# Patient Record
Sex: FEMALE | Race: WHITE | NOT HISPANIC OR LATINO
[De-identification: names, ages, dates, MRNs, and addresses within clinical notes are randomized per-mention and may not be internally consistent; named-entity substitution may affect disease eponyms.]

---

## 2018-07-25 ENCOUNTER — TRANSCRIBE ORDERS (OUTPATIENT)
Dept: SCHEDULING | Age: 28
End: 2018-07-25

## 2018-07-25 DIAGNOSIS — R93.2 ABNORMAL FINDINGS ON DIAGNOSTIC IMAGING OF LIVER AND BILIARY TRACT: Primary | ICD-10-CM

## 2018-07-27 ENCOUNTER — HOSPITAL ENCOUNTER (OUTPATIENT)
Dept: RADIOLOGY | Facility: HOSPITAL | Age: 28
Discharge: HOME | End: 2018-07-27
Attending: INTERNAL MEDICINE
Payer: COMMERCIAL

## 2018-07-27 DIAGNOSIS — R93.2 ABNORMAL FINDINGS ON DIAGNOSTIC IMAGING OF LIVER AND BILIARY TRACT: ICD-10-CM

## 2018-07-27 PROCEDURE — 76700 US EXAM ABDOM COMPLETE: CPT

## 2021-09-24 ENCOUNTER — OFFICE VISIT (OUTPATIENT)
Dept: FAMILY MEDICINE | Facility: CLINIC | Age: 31
End: 2021-09-24
Payer: COMMERCIAL

## 2021-09-24 VITALS
DIASTOLIC BLOOD PRESSURE: 76 MMHG | BODY MASS INDEX: 21.12 KG/M2 | SYSTOLIC BLOOD PRESSURE: 110 MMHG | WEIGHT: 134.6 LBS | HEIGHT: 67 IN | RESPIRATION RATE: 17 BRPM | TEMPERATURE: 97.7 F | OXYGEN SATURATION: 99 % | HEART RATE: 66 BPM

## 2021-09-24 DIAGNOSIS — G43.109 MIGRAINE WITH AURA AND WITHOUT STATUS MIGRAINOSUS, NOT INTRACTABLE: Primary | ICD-10-CM

## 2021-09-24 PROCEDURE — 90715 TDAP VACCINE 7 YRS/> IM: CPT | Performed by: STUDENT IN AN ORGANIZED HEALTH CARE EDUCATION/TRAINING PROGRAM

## 2021-09-24 PROCEDURE — 3008F BODY MASS INDEX DOCD: CPT | Performed by: STUDENT IN AN ORGANIZED HEALTH CARE EDUCATION/TRAINING PROGRAM

## 2021-09-24 PROCEDURE — 90686 IIV4 VACC NO PRSV 0.5 ML IM: CPT | Performed by: STUDENT IN AN ORGANIZED HEALTH CARE EDUCATION/TRAINING PROGRAM

## 2021-09-24 PROCEDURE — 90472 IMMUNIZATION ADMIN EACH ADD: CPT | Performed by: STUDENT IN AN ORGANIZED HEALTH CARE EDUCATION/TRAINING PROGRAM

## 2021-09-24 PROCEDURE — 90471 IMMUNIZATION ADMIN: CPT | Performed by: STUDENT IN AN ORGANIZED HEALTH CARE EDUCATION/TRAINING PROGRAM

## 2021-09-24 PROCEDURE — 99204 OFFICE O/P NEW MOD 45 MIN: CPT | Mod: 25 | Performed by: STUDENT IN AN ORGANIZED HEALTH CARE EDUCATION/TRAINING PROGRAM

## 2021-09-24 RX ORDER — IVERMECTIN 10 MG/G
CREAM TOPICAL DAILY
COMMUNITY
End: 2024-01-08

## 2021-09-24 RX ORDER — SUMATRIPTAN SUCCINATE 50 MG/1
50 TABLET ORAL ONCE AS NEEDED
COMMUNITY
End: 2021-09-24 | Stop reason: SDUPTHER

## 2021-09-24 RX ORDER — SUMATRIPTAN SUCCINATE 50 MG/1
50 TABLET ORAL ONCE AS NEEDED
Qty: 12 TABLET | Refills: 0 | Status: SHIPPED | OUTPATIENT
Start: 2021-09-24 | End: 2022-04-05

## 2021-09-24 RX ORDER — METRONIDAZOLE 7.5 MG/G
0.75 CREAM TOPICAL
COMMUNITY
Start: 2015-04-02 | End: 2024-01-08

## 2021-09-24 RX ORDER — METOPROLOL TARTRATE 25 MG/1
25 TABLET, FILM COATED ORAL 2 TIMES DAILY
Qty: 180 TABLET | Refills: 0 | Status: SHIPPED | OUTPATIENT
Start: 2021-09-24 | End: 2022-02-04 | Stop reason: ALTCHOICE

## 2021-09-24 RX ORDER — AZELAIC ACID 0.15 G/G
AEROSOL, FOAM TOPICAL 2 TIMES DAILY
COMMUNITY

## 2021-09-24 NOTE — ASSESSMENT & PLAN NOTE
Patient is a non-smoker and is not currently taking contraception  Start magnesium supplement, 400-600 mg daily  Metoprolol 25 mg twice daily sent to the pharmacy - patient will take if magnesium is not helping after a week. This is the safest daily prophylactic to use if patient becomes pregnant  Sumatriptan refilled. Patient is now aware that she can repeat the dose in about 2 hours if migraine not improved after first dose.  Return to office if needed

## 2021-09-24 NOTE — PATIENT INSTRUCTIONS
Magnesium supplement 400-600 mg  Unisom for sleep  Metoprolol 25 mg twice daily sent to the pharmacy. Take if after a week magnesium is not helping  sumatriptan - can repeat the dose in about 2 hours if not improved

## 2021-09-24 NOTE — PROGRESS NOTES
"Subjective      Patient ID: Christa Schafer is a 31 y.o. female.  1990      HPI    New patient presents for migraines.  She takes sumatriptan as needed but frequency of migraines has increased in the last few weeks now that she has started a new job.  She has had 3 migraines in the last 3 weeks but before that she had not had any migraines for 9 months.  Patient states that she was not aware she could take a second dose of her sumatriptan if the first dose was ineffective.  Patient is not on contraception at this time but she and her partner are not actively trying at this point to be pregnant.  Patient is a non-smoker.  Patient sees a therapist for PTSD developed during last job.    The following have been reviewed and updated as appropriate in this visit:  Allergies  Meds  Problems       Review of Systems reviewed and as noted in the HPI.    Objective     Vitals:    09/24/21 1136   BP: 110/76   BP Location: Left upper arm   Patient Position: Sitting   Pulse: 66   Resp: 17   Temp: 36.5 °C (97.7 °F)   TempSrc: Temporal   SpO2: 99%   Weight: 61.1 kg (134 lb 9.6 oz)   Height: 1.69 m (5' 6.54\")     Body mass index is 21.38 kg/m².    Physical Exam  Vitals reviewed.   HENT:      Head: Normocephalic and atraumatic.   Eyes:      Extraocular Movements: Extraocular movements intact.   Cardiovascular:      Rate and Rhythm: Normal rate and regular rhythm.   Pulmonary:      Effort: Pulmonary effort is normal.      Breath sounds: Normal breath sounds.   Skin:     General: Skin is warm and dry.   Neurological:      General: No focal deficit present.      Mental Status: She is alert and oriented to person, place, and time.      Sensory: No sensory deficit.      Motor: No weakness.      Coordination: Coordination normal.      Deep Tendon Reflexes: Reflexes normal.   Psychiatric:         Mood and Affect: Mood normal.         Behavior: Behavior normal.         Thought Content: Thought content normal.         Judgment: Judgment " normal.         Assessment/Plan   Diagnoses and all orders for this visit:    Migraine with aura and without status migrainosus, not intractable (Primary)  Assessment & Plan:  Patient is a non-smoker and is not currently taking contraception  Start magnesium supplement, 400-600 mg daily  Metoprolol 25 mg twice daily sent to the pharmacy - patient will take if magnesium is not helping after a week. This is the safest daily prophylactic to use if patient becomes pregnant  Sumatriptan refilled. Patient is now aware that she can repeat the dose in about 2 hours if migraine not improved after first dose.  Return to office if needed      Other orders  -     SUMAtriptan (IMITREX) 50 mg tablet; Take 1 tablet (50 mg total) by mouth once as needed for migraine. May repeat dose once in 2hr if no relief. Do not exceed 2 doses in 24hr.  -     metoprolol tartrate (LOPRESSOR) 25 mg tablet; Take 1 tablet (25 mg total) by mouth 2 (two) times a day.  -     Influenza vaccine quadrivalent preservative free 6 mon and older IM (FluLaval)  -     Tdap vaccine greater than or equal to 6yo IM    More than 30 minutes were spent during this encounter reviewing history, doing a physical exam, and documenting.      Emmy Lopez MD  9/24/2021

## 2022-02-04 ENCOUNTER — OFFICE VISIT (OUTPATIENT)
Dept: FAMILY MEDICINE | Facility: CLINIC | Age: 32
End: 2022-02-04
Payer: COMMERCIAL

## 2022-02-04 VITALS
HEIGHT: 67 IN | WEIGHT: 137.6 LBS | TEMPERATURE: 97.9 F | HEART RATE: 52 BPM | RESPIRATION RATE: 16 BRPM | OXYGEN SATURATION: 98 % | BODY MASS INDEX: 21.6 KG/M2 | SYSTOLIC BLOOD PRESSURE: 106 MMHG | DIASTOLIC BLOOD PRESSURE: 62 MMHG

## 2022-02-04 DIAGNOSIS — Z83.49 FAMILY HISTORY OF THYROID DISEASE: ICD-10-CM

## 2022-02-04 DIAGNOSIS — Z00.00 ANNUAL PHYSICAL EXAM: Primary | ICD-10-CM

## 2022-02-04 DIAGNOSIS — D50.9 IRON DEFICIENCY ANEMIA, UNSPECIFIED IRON DEFICIENCY ANEMIA TYPE: ICD-10-CM

## 2022-02-04 PROCEDURE — 3008F BODY MASS INDEX DOCD: CPT | Performed by: STUDENT IN AN ORGANIZED HEALTH CARE EDUCATION/TRAINING PROGRAM

## 2022-02-04 PROCEDURE — 99395 PREV VISIT EST AGE 18-39: CPT | Performed by: STUDENT IN AN ORGANIZED HEALTH CARE EDUCATION/TRAINING PROGRAM

## 2022-02-04 NOTE — PROGRESS NOTES
"Subjective      Patient ID: Christa Schafer is a 31 y.o. female.  1990      HPI     Pt here for routine wellness exam.    Last Wellness exam: >1 year ago  Pap: 2020, negative with GYN  Labs: due  Eye Exam: due  Dental Exam: UTD  Immunizations: UTD  Caffeine: 1 cup of coffee daily  Exercise: running  Diet: well-rounded   Occupation:   Seatbelts/sunscreen/smoke detectors: yes  Substance use: 1 drink per week  Feel safe at home/in all relationships: yes  PHQ2: 4 - sees therapist and has been referred to psychiatry    ROS:  Magnesium worked for migraines. Only needed sumatriptan twice weekly in the fall and has not needed to take for a couple of months. PTSD is a migraine trigger. She sees therapist a and is looking for a psychiatrist.  Left her most recent job that was contributing to high stress and migraines.    Denies SI/HI.  Running has helped. Has good social support from partner.  Wants to concieve in a few months and will start PNV. Aware to not use sumatriptan in pregnancy.    The following have been reviewed and updated as appropriate in this visit:   Allergies  Meds  Problems       Review of Systems reviewed and as noted in the HPI.    Objective     Vitals:    02/04/22 1010   BP: 106/62   BP Location: Left upper arm   Patient Position: Sitting   Pulse: (!) 52   Resp: 16   Temp: 36.6 °C (97.9 °F)   TempSrc: Temporal   SpO2: 98%   Weight: 62.4 kg (137 lb 9.6 oz)   Height: 1.689 m (5' 6.5\")     Body mass index is 21.88 kg/m².    Physical Exam  Vitals reviewed.   HENT:      Head: Normocephalic and atraumatic.      Right Ear: Tympanic membrane normal.      Left Ear: Tympanic membrane normal.   Eyes:      Extraocular Movements: Extraocular movements intact.   Cardiovascular:      Rate and Rhythm: Normal rate and regular rhythm.   Pulmonary:      Effort: Pulmonary effort is normal.      Breath sounds: Normal breath sounds.   Abdominal:      General: Bowel sounds are normal. There is no distension.      " Palpations: Abdomen is soft.      Tenderness: There is no abdominal tenderness.   Musculoskeletal:      Right lower leg: No edema.      Left lower leg: No edema.   Skin:     General: Skin is warm and dry.   Neurological:      General: No focal deficit present.      Mental Status: She is alert and oriented to person, place, and time.   Psychiatric:         Mood and Affect: Mood normal.         Assessment/Plan   Diagnoses and all orders for this visit:    Annual physical exam (Primary)  Take all medications as prescribed  Recommend to get 30 minutes of exercise most days of the week  Eat a diet with several servings of fruits and vegetables daily  Visit the eye doctor and dentist yearly  Follow up labs as below  -     CBC and differential; Future  -     Basic metabolic panel; Future    Family history of thyroid disease  -     TSH w reflex FT4; Future    Iron deficiency anemia, unspecified iron deficiency anemia type  -     CBC and differential; Future  -     Ferritin; Future  -     Iron and TIBC; Future        Emmy Lopez MD  2/4/2022

## 2022-02-04 NOTE — PATIENT INSTRUCTIONS
Take all medications as prescribed  Recommend to get 30 minutes of exercise most days of the week  Eat a diet with several servings of  fruits and vegetables daily  Visit the eye doctor and dentist yearly  Complete your lab work - we will call you with the results. No need to fast.  execdrin as needed for migraines

## 2022-02-12 LAB
BASOPHILS # BLD AUTO: 0 X10E3/UL (ref 0–0.2)
BASOPHILS NFR BLD AUTO: 1 %
BUN SERPL-MCNC: 10 MG/DL (ref 6–20)
BUN/CREAT SERPL: 14 (ref 9–23)
CALCIUM SERPL-MCNC: 9 MG/DL (ref 8.7–10.2)
CHLORIDE SERPL-SCNC: 103 MMOL/L (ref 96–106)
CO2 SERPL-SCNC: 23 MMOL/L (ref 20–29)
CREAT SERPL-MCNC: 0.69 MG/DL (ref 0.57–1)
EOSINOPHIL # BLD AUTO: 0.1 X10E3/UL (ref 0–0.4)
EOSINOPHIL NFR BLD AUTO: 1 %
ERYTHROCYTE [DISTWIDTH] IN BLOOD BY AUTOMATED COUNT: 12.8 % (ref 11.7–15.4)
FERRITIN SERPL-MCNC: 25 NG/ML (ref 15–150)
GLUCOSE SERPL-MCNC: 90 MG/DL (ref 65–99)
HCT VFR BLD AUTO: 38.2 % (ref 34–46.6)
HGB BLD-MCNC: 13.1 G/DL (ref 11.1–15.9)
IMM GRANULOCYTES # BLD AUTO: 0 X10E3/UL (ref 0–0.1)
IMM GRANULOCYTES NFR BLD AUTO: 0 %
IRON SATN MFR SERPL: 19 % (ref 15–55)
IRON SERPL-MCNC: 66 UG/DL (ref 27–159)
LAB CORP EGFR IF AFRICN AM: 136 ML/MIN/1.73
LAB CORP EGFR IF NONAFRICN AM: 118 ML/MIN/1.73
LYMPHOCYTES # BLD AUTO: 2.1 X10E3/UL (ref 0.7–3.1)
LYMPHOCYTES NFR BLD AUTO: 37 %
MCH RBC QN AUTO: 27.6 PG (ref 26.6–33)
MCHC RBC AUTO-ENTMCNC: 34.3 G/DL (ref 31.5–35.7)
MCV RBC AUTO: 81 FL (ref 79–97)
MONOCYTES # BLD AUTO: 0.5 X10E3/UL (ref 0.1–0.9)
MONOCYTES NFR BLD AUTO: 9 %
NEUTROPHILS # BLD AUTO: 3 X10E3/UL (ref 1.4–7)
NEUTROPHILS NFR BLD AUTO: 52 %
PLATELET # BLD AUTO: 292 X10E3/UL (ref 150–450)
POTASSIUM SERPL-SCNC: 4 MMOL/L (ref 3.5–5.2)
RBC # BLD AUTO: 4.74 X10E6/UL (ref 3.77–5.28)
SODIUM SERPL-SCNC: 141 MMOL/L (ref 134–144)
T4 FREE SERPL-MCNC: 1.34 NG/DL (ref 0.82–1.77)
TIBC SERPL-MCNC: 354 UG/DL (ref 250–450)
TSH SERPL DL<=0.005 MIU/L-ACNC: 1.54 UIU/ML (ref 0.45–4.5)
UIBC SERPL-MCNC: 288 UG/DL (ref 131–425)
WBC # BLD AUTO: 5.7 X10E3/UL (ref 3.4–10.8)

## 2022-04-05 RX ORDER — SUMATRIPTAN SUCCINATE 50 MG/1
TABLET ORAL
Qty: 12 TABLET | Refills: 0 | Status: SHIPPED | OUTPATIENT
Start: 2022-04-05 | End: 2022-07-27 | Stop reason: SDUPTHER

## 2022-07-08 ENCOUNTER — TELEPHONE (OUTPATIENT)
Dept: FAMILY MEDICINE | Facility: CLINIC | Age: 32
End: 2022-07-08
Payer: COMMERCIAL

## 2022-07-28 RX ORDER — SUMATRIPTAN SUCCINATE 50 MG/1
50 TABLET ORAL ONCE AS NEEDED
Qty: 12 TABLET | Refills: 0 | Status: SHIPPED | OUTPATIENT
Start: 2022-07-28 | End: 2024-01-08

## 2022-11-29 ENCOUNTER — OFFICE VISIT (OUTPATIENT)
Dept: FAMILY MEDICINE | Facility: CLINIC | Age: 32
End: 2022-11-29
Payer: COMMERCIAL

## 2022-11-29 VITALS
DIASTOLIC BLOOD PRESSURE: 68 MMHG | BODY MASS INDEX: 22.02 KG/M2 | SYSTOLIC BLOOD PRESSURE: 118 MMHG | TEMPERATURE: 98.3 F | OXYGEN SATURATION: 98 % | HEIGHT: 66 IN | HEART RATE: 65 BPM | WEIGHT: 137 LBS | RESPIRATION RATE: 16 BRPM

## 2022-11-29 DIAGNOSIS — H92.03 OTALGIA OF BOTH EARS: Primary | ICD-10-CM

## 2022-11-29 PROCEDURE — 99213 OFFICE O/P EST LOW 20 MIN: CPT | Performed by: FAMILY MEDICINE

## 2022-11-29 PROCEDURE — 3008F BODY MASS INDEX DOCD: CPT | Performed by: FAMILY MEDICINE

## 2022-11-29 RX ORDER — PRAZOSIN HYDROCHLORIDE 1 MG/1
CAPSULE ORAL
COMMUNITY
Start: 2022-10-06

## 2022-11-29 RX ORDER — SERTRALINE HYDROCHLORIDE 100 MG/1
TABLET, FILM COATED ORAL
COMMUNITY
Start: 2022-11-04 | End: 2024-01-08

## 2022-11-29 NOTE — PROGRESS NOTES
Subjective      Patient ID: Christa Grossman is a 32 y.o. female is here for the following:    Ear Pain/Pressure  -Started about 2 weeks ago  -Traveled to [place] to see family and was around a 1 year old   -Pain and fullness in ears began after flight back  -They did some travel with altitude changes while in [place]  -No illness symptoms, no congestion  -Thinks there may have been an adjustment in cabin pressure at the beginning of one of her flights      The following have been reviewed and updated as appropriate in this visit:   Tobacco  Allergies  Meds  Problems  Med Hx  Surg Hx  Fam Hx         Patient Active Problem List   Diagnosis    Migraine with aura and without status migrainosus, not intractable       Social History     Tobacco Use    Smoking status: Never    Smokeless tobacco: Never   Vaping Use    Vaping Use: Never used   Substance Use Topics    Alcohol use: Yes     Alcohol/week: 1.0 standard drink     Types: 1 Glasses of wine per week    Drug use: Never       Allergies as of 11/29/2022 - Reviewed 11/29/2022   Allergen Reaction Noted    Frank Hives 04/02/2015    Sulfa (sulfonamide antibiotics) Hives 04/02/2015         Current Outpatient Medications:     azelaic acid (FINACEA) 15 % foam, Apply topically 2 (two) times a day., Disp: , Rfl:     ivermectin 1 % cream, Apply topically daily., Disp: , Rfl:     metroNIDAZOLE (METROCREAM) 0.75 % cream, 0.75 %., Disp: , Rfl:     prazosin (MINIPRESS) 1 mg capsule, TAKE THREE CAPSULES BY MOUTH EVERY EVENING, Disp: , Rfl:     sertraline (ZOLOFT) 100 mg tablet, TAKE ONE AND ONE-HALF TABLETS BY MOUTH EVERY EVENING, Disp: , Rfl:     SUMAtriptan (IMITREX) 50 mg tablet, Take 1 tablet (50 mg total) by mouth once as needed for migraine for up to 12 doses. May repeat dose once in 2hr if no relief. Do not exceed 2 doses in 24hr., Disp: 12 tablet, Rfl: 0    Review of systems  Per HPI.    Objective   Vitals:    11/29/22 1534   BP: 118/68   BP Location: Left upper arm  "  Patient Position: Sitting   Pulse: 65   Resp: 16   Temp: 36.8 °C (98.3 °F)   TempSrc: Temporal   SpO2: 98%   Weight: 62.1 kg (137 lb)   Height: 1.689 m (5' 6.5\")     Body mass index is 21.78 kg/m².    Physical Exam  Vitals reviewed.   Constitutional:       General: She is not in acute distress.     Appearance: She is well-developed.   HENT:      Right Ear: Tympanic membrane and ear canal normal.      Left Ear: Tympanic membrane and ear canal normal.   Neck:      Thyroid: No thyromegaly.   Cardiovascular:      Rate and Rhythm: Normal rate and regular rhythm.      Heart sounds: Normal heart sounds. No murmur heard.  Pulmonary:      Effort: Pulmonary effort is normal. No respiratory distress.      Breath sounds: Normal breath sounds. No wheezing.   Abdominal:      General: Bowel sounds are normal. There is no distension.      Palpations: Abdomen is soft.      Tenderness: There is no abdominal tenderness.   Musculoskeletal:      Cervical back: Normal range of motion and neck supple.   Skin:     General: Skin is warm.      Findings: No rash.   Psychiatric:         Behavior: Behavior normal.         Thought Content: Thought content normal.         Judgment: Judgment normal.       No images are attached to the encounter.     Assessment/Plan   Problem List Items Addressed This Visit    None  Visit Diagnoses     Otalgia of both ears    -  Primary  Began after recent travel to [place]  No illness symptoms including fever, chills, congestion  Likely due to altitude and pressure changes during travel    -Reviewed OTC supportive care with nasal rinse and decongestants   -Will follow up if not improving            I spent 20 minutes on this date of service performing the following activities: obtaining history, performing examination, entering orders, documenting and providing counseling and education.      Benny Voss MD  11/29/2022    Portions of the above dictation were performed using Dragon dictation software.  " Please excuse any typos or grammatical errors.

## 2023-08-11 ENCOUNTER — OFFICE VISIT (OUTPATIENT)
Dept: FAMILY MEDICINE | Facility: CLINIC | Age: 33
End: 2023-08-11
Payer: COMMERCIAL

## 2023-08-11 VITALS
HEART RATE: 81 BPM | OXYGEN SATURATION: 99 % | BODY MASS INDEX: 22.91 KG/M2 | TEMPERATURE: 97.9 F | WEIGHT: 146 LBS | HEIGHT: 67 IN | DIASTOLIC BLOOD PRESSURE: 78 MMHG | RESPIRATION RATE: 16 BRPM | SYSTOLIC BLOOD PRESSURE: 118 MMHG

## 2023-08-11 DIAGNOSIS — G43.109 MIGRAINE WITH AURA AND WITHOUT STATUS MIGRAINOSUS, NOT INTRACTABLE: ICD-10-CM

## 2023-08-11 DIAGNOSIS — Z83.49 FAMILY HISTORY OF THYROID DISEASE: ICD-10-CM

## 2023-08-11 DIAGNOSIS — Z00.00 ANNUAL PHYSICAL EXAM: Primary | ICD-10-CM

## 2023-08-11 PROCEDURE — 3008F BODY MASS INDEX DOCD: CPT | Performed by: STUDENT IN AN ORGANIZED HEALTH CARE EDUCATION/TRAINING PROGRAM

## 2023-08-11 PROCEDURE — 99395 PREV VISIT EST AGE 18-39: CPT | Performed by: STUDENT IN AN ORGANIZED HEALTH CARE EDUCATION/TRAINING PROGRAM

## 2023-08-11 RX ORDER — METOCLOPRAMIDE 10 MG/1
10 TABLET ORAL DAILY PRN
Qty: 10 TABLET | Refills: 0 | Status: SHIPPED | OUTPATIENT
Start: 2023-08-11 | End: 2024-05-15 | Stop reason: SDUPTHER

## 2023-08-11 ASSESSMENT — PATIENT HEALTH QUESTIONNAIRE - PHQ9
SUM OF ALL RESPONSES TO PHQ QUESTIONS 1-9: 9
SUM OF ALL RESPONSES TO PHQ9 QUESTIONS 1 & 2: 3

## 2023-08-11 NOTE — PROGRESS NOTES
"Subjective      Patient ID: Christa Grossman is a 33 y.o. female.  1990      HPI     Pt here for routine wellness exam.  Trying to conceive with partner and is on PNV.    Last Wellness exam: >1 year ago  Pap: UTD  Labs: will get in pregnancy  Immunizations: UTD  PHQ2: 0    Starting zoloft and prazosin through psychiatrist helped with migraines, as PTSD improved.  Magnesium worked in the past. Then started getting diarrhea, so stopped. When restarted again it was not helpful.  Gets headaches daily but gets migraines about once monthly.  Got covid in May and PTSD worsened for a couple of months. Also then had to cut back zoloft as was having nightmares.  Migraines are stress-induced, so patient is working to manage stress.    The following have been reviewed and updated as appropriate in this visit:   Allergies  Meds  Problems       Review of Systems reviewed and as noted in the HPI.    Objective     Vitals:    08/11/23 1046   BP: 118/78   BP Location: Right upper arm   Patient Position: Sitting   Pulse: 81   Resp: 16   Temp: 36.6 °C (97.9 °F)   TempSrc: Temporal   SpO2: 99%   Weight: 66.2 kg (146 lb)   Height: 1.689 m (5' 6.5\")     Body mass index is 23.21 kg/m².    Physical Exam  Vitals reviewed.   HENT:      Head: Normocephalic and atraumatic.      Right Ear: Tympanic membrane normal.      Left Ear: Tympanic membrane normal.   Eyes:      Extraocular Movements: Extraocular movements intact.   Neurological:      General: No focal deficit present.      Mental Status: She is alert and oriented to person, place, and time.   Psychiatric:         Mood and Affect: Mood normal.         Assessment/Plan   Diagnoses and all orders for this visit:    Annual physical exam (Primary)  Take all medications as prescribed  Recommend to get 30 minutes of exercise most days of the week  Eat a diet with several servings of fruits and vegetables daily  Visit the eye doctor and dentist yearly    Migraine with aura and without status " migrainosus, not intractable  About once monthly  Start with acetaminophen alone, then can add metoclopramide if needed    Family history of thyroid disease  -     TSH w reflex FT4; Future    Other orders  -     metoclopramide (REGLAN) 10 mg tablet; Take 1 tablet (10 mg total) by mouth daily as needed (migraine) for up to 10 days.        Emmy Lopez MD  8/11/2023

## 2023-10-03 ENCOUNTER — APPOINTMENT (EMERGENCY)
Dept: ULTRASOUND IMAGING | Facility: HOSPITAL | Age: 33
End: 2023-10-03
Payer: COMMERCIAL

## 2023-10-03 ENCOUNTER — HOSPITAL ENCOUNTER (EMERGENCY)
Facility: HOSPITAL | Age: 33
Discharge: HOME | End: 2023-10-03
Attending: EMERGENCY MEDICINE
Payer: COMMERCIAL

## 2023-10-03 VITALS
HEIGHT: 67 IN | DIASTOLIC BLOOD PRESSURE: 74 MMHG | SYSTOLIC BLOOD PRESSURE: 126 MMHG | RESPIRATION RATE: 18 BRPM | OXYGEN SATURATION: 100 % | HEART RATE: 95 BPM | TEMPERATURE: 98.2 F | BODY MASS INDEX: 22.76 KG/M2 | WEIGHT: 145 LBS

## 2023-10-03 DIAGNOSIS — O26.899 ABDOMINAL PAIN IN PREGNANCY: Primary | ICD-10-CM

## 2023-10-03 DIAGNOSIS — O99.891 ASYMPTOMATIC BACTERIURIA DURING PREGNANCY: ICD-10-CM

## 2023-10-03 DIAGNOSIS — Z34.91 FIRST TRIMESTER PREGNANCY: ICD-10-CM

## 2023-10-03 DIAGNOSIS — O21.9 NAUSEA AND VOMITING OF PREGNANCY, ANTEPARTUM: ICD-10-CM

## 2023-10-03 DIAGNOSIS — R82.71 ASYMPTOMATIC BACTERIURIA DURING PREGNANCY: ICD-10-CM

## 2023-10-03 DIAGNOSIS — R10.9 ABDOMINAL PAIN IN PREGNANCY: Primary | ICD-10-CM

## 2023-10-03 LAB
ALBUMIN SERPL BCP-MCNC: 4.2 G/DL (ref 3.5–5)
ALP SERPL-CCNC: 45 U/L (ref 34–104)
ALT SERPL W P-5'-P-CCNC: 18 U/L (ref 7–52)
ANION GAP SERPL CALCULATED.3IONS-SCNC: 7 MMOL/L
AST SERPL W P-5'-P-CCNC: 19 U/L (ref 13–39)
B-HCG SERPL-ACNC: 9237 MIU/ML (ref 0–5)
BACTERIA UR QL AUTO: ABNORMAL /HPF
BASOPHILS # BLD AUTO: 0.01 THOUSANDS/ÂΜL (ref 0–0.1)
BASOPHILS NFR BLD AUTO: 0 % (ref 0–1)
BILIRUB SERPL-MCNC: 0.7 MG/DL (ref 0.2–1)
BILIRUB UR QL STRIP: NEGATIVE
BUN SERPL-MCNC: 8 MG/DL (ref 5–25)
CALCIUM SERPL-MCNC: 8.7 MG/DL (ref 8.4–10.2)
CHLORIDE SERPL-SCNC: 103 MMOL/L (ref 96–108)
CLARITY UR: CLEAR
CO2 SERPL-SCNC: 25 MMOL/L (ref 21–32)
COLOR UR: YELLOW
CREAT SERPL-MCNC: 0.58 MG/DL (ref 0.6–1.3)
EOSINOPHIL # BLD AUTO: 0.01 THOUSAND/ÂΜL (ref 0–0.61)
EOSINOPHIL NFR BLD AUTO: 0 % (ref 0–6)
ERYTHROCYTE [DISTWIDTH] IN BLOOD BY AUTOMATED COUNT: 12.3 % (ref 11.6–15.1)
FLUAV RNA RESP QL NAA+PROBE: NEGATIVE
FLUBV RNA RESP QL NAA+PROBE: NEGATIVE
GFR SERPL CREATININE-BSD FRML MDRD: 123 ML/MIN/1.73SQ M
GLUCOSE SERPL-MCNC: 104 MG/DL (ref 65–140)
GLUCOSE UR STRIP-MCNC: NEGATIVE MG/DL
HCT VFR BLD AUTO: 40.1 % (ref 34.8–46.1)
HGB BLD-MCNC: 13.5 G/DL (ref 11.5–15.4)
HGB UR QL STRIP.AUTO: NEGATIVE
HOLD SPECIMEN: NORMAL
IMM GRANULOCYTES # BLD AUTO: 0.02 THOUSAND/UL (ref 0–0.2)
IMM GRANULOCYTES NFR BLD AUTO: 0 % (ref 0–2)
KETONES UR STRIP-MCNC: NEGATIVE MG/DL
LEUKOCYTE ESTERASE UR QL STRIP: ABNORMAL
LYMPHOCYTES # BLD AUTO: 0.54 THOUSANDS/ÂΜL (ref 0.6–4.47)
LYMPHOCYTES NFR BLD AUTO: 7 % (ref 14–44)
MCH RBC QN AUTO: 27.2 PG (ref 26.8–34.3)
MCHC RBC AUTO-ENTMCNC: 33.7 G/DL (ref 31.4–37.4)
MCV RBC AUTO: 81 FL (ref 82–98)
MONOCYTES # BLD AUTO: 0.34 THOUSAND/ÂΜL (ref 0.17–1.22)
MONOCYTES NFR BLD AUTO: 4 % (ref 4–12)
MUCOUS THREADS UR QL AUTO: ABNORMAL
NEUTROPHILS # BLD AUTO: 6.93 THOUSANDS/ÂΜL (ref 1.85–7.62)
NEUTS SEG NFR BLD AUTO: 89 % (ref 43–75)
NITRITE UR QL STRIP: NEGATIVE
NON-SQ EPI CELLS URNS QL MICRO: ABNORMAL /HPF
NRBC BLD AUTO-RTO: 0 /100 WBCS
PH UR STRIP.AUTO: 6.5 [PH]
PLATELET # BLD AUTO: 240 THOUSANDS/UL (ref 149–390)
PMV BLD AUTO: 8.3 FL (ref 8.9–12.7)
POTASSIUM SERPL-SCNC: 3.6 MMOL/L (ref 3.5–5.3)
PROT SERPL-MCNC: 7 G/DL (ref 6.4–8.4)
PROT UR STRIP-MCNC: ABNORMAL MG/DL
RBC # BLD AUTO: 4.96 MILLION/UL (ref 3.81–5.12)
RBC #/AREA URNS AUTO: ABNORMAL /HPF
RSV RNA RESP QL NAA+PROBE: NEGATIVE
SARS-COV-2 RNA RESP QL NAA+PROBE: NEGATIVE
SODIUM SERPL-SCNC: 135 MMOL/L (ref 135–147)
SP GR UR STRIP.AUTO: >=1.03 (ref 1–1.03)
UROBILINOGEN UR STRIP-ACNC: 2 MG/DL
WBC # BLD AUTO: 7.85 THOUSAND/UL (ref 4.31–10.16)
WBC #/AREA URNS AUTO: ABNORMAL /HPF

## 2023-10-03 PROCEDURE — 76705 ECHO EXAM OF ABDOMEN: CPT

## 2023-10-03 PROCEDURE — 99283 EMERGENCY DEPT VISIT LOW MDM: CPT

## 2023-10-03 PROCEDURE — 36415 COLL VENOUS BLD VENIPUNCTURE: CPT

## 2023-10-03 PROCEDURE — 84702 CHORIONIC GONADOTROPIN TEST: CPT | Performed by: EMERGENCY MEDICINE

## 2023-10-03 PROCEDURE — 0241U HB NFCT DS VIR RESP RNA 4 TRGT: CPT | Performed by: EMERGENCY MEDICINE

## 2023-10-03 PROCEDURE — 99284 EMERGENCY DEPT VISIT MOD MDM: CPT | Performed by: EMERGENCY MEDICINE

## 2023-10-03 PROCEDURE — 76815 OB US LIMITED FETUS(S): CPT

## 2023-10-03 PROCEDURE — 81001 URINALYSIS AUTO W/SCOPE: CPT | Performed by: EMERGENCY MEDICINE

## 2023-10-03 PROCEDURE — 96360 HYDRATION IV INFUSION INIT: CPT

## 2023-10-03 PROCEDURE — 87086 URINE CULTURE/COLONY COUNT: CPT | Performed by: EMERGENCY MEDICINE

## 2023-10-03 PROCEDURE — 80053 COMPREHEN METABOLIC PANEL: CPT | Performed by: EMERGENCY MEDICINE

## 2023-10-03 PROCEDURE — 85025 COMPLETE CBC W/AUTO DIFF WBC: CPT | Performed by: EMERGENCY MEDICINE

## 2023-10-03 RX ORDER — CEPHALEXIN 500 MG/1
500 CAPSULE ORAL EVERY 6 HOURS SCHEDULED
Qty: 28 CAPSULE | Refills: 0 | Status: SHIPPED | OUTPATIENT
Start: 2023-10-03 | End: 2023-10-10

## 2023-10-03 RX ORDER — CEPHALEXIN 250 MG/1
500 CAPSULE ORAL ONCE
Status: COMPLETED | OUTPATIENT
Start: 2023-10-03 | End: 2023-10-03

## 2023-10-03 RX ORDER — FAMOTIDINE 20 MG
1 TABLET ORAL DAILY
Qty: 30 TABLET | Refills: 0 | Status: SHIPPED | OUTPATIENT
Start: 2023-10-03

## 2023-10-03 RX ORDER — PYRIDOXINE HCL (VITAMIN B6) 50 MG
25 TABLET ORAL ONCE
Status: COMPLETED | OUTPATIENT
Start: 2023-10-03 | End: 2023-10-03

## 2023-10-03 RX ORDER — DIPHENHYDRAMINE HYDROCHLORIDE 25 MG/1
25 CAPSULE ORAL EVERY 6 HOURS PRN
Qty: 30 TABLET | Refills: 0 | Status: SHIPPED | OUTPATIENT
Start: 2023-10-03

## 2023-10-03 RX ADMIN — PYRIDOXINE HCL TAB 50 MG 25 MG: 50 TAB at 22:39

## 2023-10-03 RX ADMIN — DOXYLAMINE SUCCINATE 12.5 MG: 25 TABLET ORAL at 22:38

## 2023-10-03 RX ADMIN — CEPHALEXIN 500 MG: 250 CAPSULE ORAL at 23:40

## 2023-10-03 RX ADMIN — SODIUM CHLORIDE 1000 ML: 0.9 INJECTION, SOLUTION INTRAVENOUS at 22:39

## 2023-10-04 NOTE — ED PROVIDER NOTES
History  Chief Complaint   Patient presents with    Vomiting     Pt reports nausea and vomiting that started to today. Reports abdominal cramping as well and is not able to eat normally. Denies spotting. Pt reports she is 5 weeks pregnant. 34year old  at 5 weeks 1 day gestation by dates presents for evaluation of nausea, vomiting and abdominal pain which began today. Patient states she initially had upper abdominal discomfort, but the pain gradually moved into the lower abdomen throughout the day. Pain is intermittent and sharp in nature. No vaginal bleeding. Vomiting began around 3 pm with 15 minute episode of vomiting followed by retching throughout the day. Patient has not taken anything for her symptoms prior to arrival.  She has not yet seen an OB/GYN for this pregnancy and has not had prior ultrasound to confirm location of pregnancy. None       Past Medical History:   Diagnosis Date    PTSD (post-traumatic stress disorder)        Past Surgical History:   Procedure Laterality Date    WISDOM TOOTH EXTRACTION         History reviewed. No pertinent family history. I have reviewed and agree with the history as documented. E-Cigarette/Vaping    E-Cigarette Use Never User      E-Cigarette/Vaping Substances    Nicotine No     THC No     CBD No     Flavoring No     Other No     Unknown No      Social History     Tobacco Use    Smoking status: Never    Smokeless tobacco: Never   Vaping Use    Vaping Use: Never used   Substance Use Topics    Alcohol use: Not Currently    Drug use: Never       Review of Systems    Physical Exam  Physical Exam  Vitals and nursing note reviewed. HENT:      Head: Normocephalic and atraumatic. Cardiovascular:      Rate and Rhythm: Normal rate and regular rhythm. Pulses: Normal pulses. Pulmonary:      Effort: Pulmonary effort is normal. No respiratory distress. Abdominal:      General: There is no distension. Palpations: Abdomen is soft. Tenderness: There is abdominal tenderness in the right lower quadrant. There is no guarding or rebound. Skin:     General: Skin is warm and dry. Neurological:      Mental Status: She is alert. Vital Signs  ED Triage Vitals [10/03/23 2031]   Temperature Pulse Respirations Blood Pressure SpO2   98.2 °F (36.8 °C) 95 18 126/74 100 %      Temp Source Heart Rate Source Patient Position - Orthostatic VS BP Location FiO2 (%)   Temporal Monitor -- Left arm --      Pain Score       7           Vitals:    10/03/23 2031   BP: 126/74   Pulse: 95         Visual Acuity      ED Medications  Medications   sodium chloride 0.9 % bolus 1,000 mL (0 mL Intravenous Stopped 10/3/23 2339)   doxylamine (UNISOM) tablet 12.5 mg (12.5 mg Oral Given 10/3/23 2238)   pyridoxine (VITAMIN B6) tablet 25 mg (25 mg Oral Given 10/3/23 2239)   cephalexin (KEFLEX) capsule 500 mg (500 mg Oral Given 10/3/23 2340)       Diagnostic Studies  Results Reviewed       Procedure Component Value Units Date/Time    Urine Microscopic [[id number]]  (Abnormal) Collected: 10/03/23 2156    Lab Status: Final result Specimen: Urine, Clean Catch Updated: 10/03/23 2224     RBC, UA None Seen /hpf      WBC, UA 0-1 /hpf      Epithelial Cells Occasional /hpf      Bacteria, UA Moderate /hpf      MUCUS THREADS Occasional     URINE COMMENT --    UA w Reflex to Microscopic w Reflex to Culture [[id number]]  (Abnormal) Collected: 10/03/23 2156    Lab Status: Final result Specimen: Urine, Clean Catch Updated: 10/03/23 2224     Color, UA Yellow     Clarity, UA Clear     Specific Gravity, UA >=1.030     pH, UA 6.5     Leukocytes, UA Trace     Nitrite, UA Negative     Protein, UA Trace mg/dl      Glucose, UA Negative mg/dl      Ketones, UA Negative mg/dl      Urobilinogen, UA 2.0 mg/dl      Bilirubin, UA Negative     Occult Blood, UA Negative     URINE COMMENT --    Urine culture [[id number]] Collected: 10/03/23 2156    Lab Status:  In process Specimen: Urine, Clean Catch Updated: 10/03/23 2224    [place] draw [[id number]] Collected: 10/03/23 2044    Lab Status: Final result Specimen: Blood from Arm, Left Updated: 10/03/23 2202    Narrative: The following orders were created for panel order [place] draw. Procedure                               Abnormality         Status                     ---------                               -----------         ------                     Thelda [place]issa Top on [facility][[id number]]                           Final result               Green / Black tube on [facility][[id number]]                       Final result                 Please view results for these tests on the individual orders. COVID/FLU/RSV [[id number]]  (Normal) Collected: 10/03/23 2044    Lab Status: Final result Specimen: Nares from Nose Updated: 10/03/23 2144     SARS-CoV-2 Negative     INFLUENZA A PCR Negative     INFLUENZA B PCR Negative     RSV PCR Negative    Narrative:      FOR PEDIATRIC PATIENTS - copy/paste COVID Guidelines URL to browser: [email or web address]    SARS-CoV-2 assay is a Nucleic Acid Amplification assay intended for the  qualitative detection of nucleic acid from SARS-CoV-2 in nasopharyngeal  swabs. Results are for the presumptive identification of SARS-CoV-2 RNA. Positive results are indicative of infection with SARS-CoV-2, the virus  causing COVID-19, but do not rule out bacterial infection or co-infection  with other viruses. Laboratories within the [facility] and its  territories are required to report all positive results to the appropriate  public health authorities. Negative results do not preclude SARS-CoV-2  infection and should not be used as the sole basis for treatment or other  patient management decisions. Negative results must be combined with  clinical observations, patient history, and epidemiological information. This test has not been FDA cleared or approved.     This test has been authorized by FDA under an Emergency Use Authorization  (EUA). This test is only authorized for the duration of time the  declaration that circumstances exist justifying the authorization of the  emergency use of an in vitro diagnostic tests for detection of SARS-CoV-2  virus and/or diagnosis of COVID-19 infection under section 564(b)(1) of  the Act, 21 U. S.C. 270VIS-4(E)(2), unless the authorization is terminated  or revoked sooner. The test has been validated but independent review by FDA  and CLIA is pending. Test performed using [employer]: This RT-PCR assay targets N2,  a region unique to SARS-CoV-2. A conserved region in the E-gene was chosen  for pan-Sarbecovirus detection which includes SARS-CoV-2. According to CMS-2020-01-R, this platform meets the definition of high-throughput technology. hCG, quantitative [[id number]]  (Abnormal) Collected: 10/03/23 2044    Lab Status: Final result Specimen: Blood from Arm, Left Updated: 10/03/23 2134     HCG, Quant 9,237 mIU/mL     Narrative:       Expected Ranges:    HCG results between 5 and 25 mIU/mL may be indicative of early pregnancy but should be interpreted in light of the total clinical presentation. HCG can rise to detectable levels in sudha and post menopausal women (0-11.6 mIU/mL).      Approximate               Approximate HCG  Gestation age          Concentration ( mIU/mL)  _____________          ______________________   Lauraine Flatter                      HCG values  0.2-1                       5-50  1-2                           2-3                         100-5000  3-4                         500-85733  4-5                         1000-49239  5-6                         [id number]  6-8                         [id number]  8-12                        [id number]      Comprehensive metabolic panel [[id number]]  (Abnormal) Collected: 10/03/23 2044    Lab Status: Final result Specimen: Blood from Arm, Left Updated: 10/03/23 2109     Sodium 135 mmol/L Potassium 3.6 mmol/L      Chloride 103 mmol/L      CO2 25 mmol/L      ANION GAP 7 mmol/L      BUN 8 mg/dL      Creatinine 0.58 mg/dL      Glucose 104 mg/dL      Calcium 8.7 mg/dL      AST 19 U/L      ALT 18 U/L      Alkaline Phosphatase 45 U/L      Total Protein 7.0 g/dL      Albumin 4.2 g/dL      Total Bilirubin 0.70 mg/dL      eGFR 123 ml/min/1.73sq m     Narrative:      [place] guidelines for Chronic Kidney Disease (CKD):     Stage 1 with normal or high GFR (GFR > 90 mL/min/1.73 square meters)    Stage 2 Mild CKD (GFR = 60-89 mL/min/1.73 square meters)    Stage 3A Moderate CKD (GFR = 45-59 mL/min/1.73 square meters)    Stage 3B Moderate CKD (GFR = 30-44 mL/min/1.73 square meters)    Stage 4 Severe CKD (GFR = 15-29 mL/min/1.73 square meters)    Stage 5 End Stage CKD (GFR <15 mL/min/1.73 square meters)  Note: GFR calculation is accurate only with a steady state creatinine    CBC and differential [[id number]]  (Abnormal) Collected: 10/03/23 2044    Lab Status: Final result Specimen: Blood from Arm, Left Updated: 10/03/23 2050     WBC 7.85 Thousand/uL      RBC 4.96 Million/uL      Hemoglobin 13.5 g/dL      Hematocrit 40.1 %      MCV 81 fL      MCH 27.2 pg      MCHC 33.7 g/dL      RDW 12.3 %      MPV 8.3 fL      Platelets 555 Thousands/uL      nRBC 0 /100 WBCs      Neutrophils Relative 89 %      Immat GRANS % 0 %      Lymphocytes Relative 7 %      Monocytes Relative 4 %      Eosinophils Relative 0 %      Basophils Relative 0 %      Neutrophils Absolute 6.93 Thousands/µL      Immature Grans Absolute 0.02 Thousand/uL      Lymphocytes Absolute 0.54 Thousands/µL      Monocytes Absolute 0.34 Thousand/µL      Eosinophils Absolute 0.01 Thousand/µL      Basophils Absolute 0.01 Thousands/µL                    US appendix   Final Result by Cristo Miranda DO (10/03 2318)      Although the appendix is not identified, there are no secondary sonographic findings to suggest acute appendicitis within the imaged region. Workstation performed: [id number]         [address] OB pregnancy limited with transvaginal   Final Result by Ketan Holloway DO (10/03 2317)      Probable small intrauterine gestational sac. No yolk sac or fetal pole yet identified, likely secondary to early pregnancy. Recommend short-term follow-up ultrasound and beta hCG trending to ensure normal development. Possible septate uterus. Workstation performed: [id number]                    Procedures  Procedures         ED Course  ED Course as of 10/03/23 2347   Tue Oct 03, 2023   2226 Bacteria, UA(!): Moderate  Asymptomatic bacteriuria. Culture pending                                             Medical Decision Making  34year old  at 5 weeks 1 day gestation by dates presents for evaluation of abdominal pain, nausea and vomiting. B6 and unisom given for nausea/vomiting of pregnancy. IV hydration. Labs unremarkable. Ultrasound ordered to r/o ectopic, ovarian pathology or, less likely, appendicitis. Ultrasound unable to visualize appendix, but with no secondary signs of appendicitis. Suspected intrauterine gestational sac on pelvic ultrasound. As no fetal pole was observed, repeat beta hcg in 2 days prescribed. B6 and unisom prescribed for nausea and vitamin. Prenatal vitamins. Pelvic rest.  OB/GYN follow up. Return precautions provided. Amount and/or Complexity of Data Reviewed  Labs: ordered. Decision-making details documented in ED Course. Radiology: ordered. Risk  OTC drugs. Prescription drug management.         Disposition  Final diagnoses:   Asymptomatic bacteriuria during pregnancy   Nausea and vomiting of pregnancy, antepartum   Abdominal pain in pregnancy   First trimester pregnancy     Time reflects when diagnosis was documented in both MDM as applicable and the Disposition within this note       Time User Action Codes Description Comment    10/3/2023 10:41 PM Dru Villa [T20.515,  R82.71] Asymptomatic bacteriuria during pregnancy     10/3/2023 11:35 PM Linda Ledezma Add [O21.9] Nausea and vomiting of pregnancy, antepartum     10/3/2023 11:35 PM Noble Fabry Add [O26.899,  R10.9] Abdominal pain in pregnancy     10/3/2023 11:38 PM Noble Fabry Add [Z34.91] First trimester pregnancy     10/3/2023 11:40 PM Noble Fabry Modify [U26.444,  R82.71] Asymptomatic bacteriuria during pregnancy     10/3/2023 11:40 PM Noble Fabry Modify [O26.899,  R10.9] Abdominal pain in pregnancy           ED Disposition       ED Disposition   Discharge    Condition   Stable    Date/Time   Tue Oct 3, 2023 11:35 PM    Comment   Mario Moran discharge to home/self care.                    Follow-up Information       Follow up With Specialties Details Why Contact Info Additional Information    your OB/GYN  Schedule an appointment as soon as possible for a visit in 3 days for re-evaluation       [address] [place] [address] Emergency Department Emergency Medicine Go to  If symptoms worsen [address] [facility] [phone]  [address] [facility] Emergency Department, [address] [facility], [facility], [address] [facility]            Patient's Medications   Discharge Prescriptions    CEPHALEXIN (KEFLEX) 500 MG CAPSULE    Take 1 capsule (500 mg total) by mouth every 6 (six) hours for 7 days       Start Date: 10/3/2023 End Date: 10/10/2023       Order Dose: 500 mg       Quantity: 28 capsule    Refills: 0    DOXYLAMINE (UNISOM) 25 MG TABLET    Take 0.5 tablets (12.5 mg total) by mouth daily at bedtime as needed for nausea       Start Date: 10/3/2023 End Date: --       Order Dose: 12.5 mg       Quantity: 30 tablet    Refills: 0    PRENATAL VIT-FE FUMARATE-FA (PRENATAL COMPLETE) 14-0.4 MG TABS    Take 1 tablet by mouth in the morning       Start Date: 10/3/2023 End Date: --       Order Dose: 1 tablet       Quantity: 30 tablet    Refills: 0    PYRIDOXINE HCL (VITAMIN B-6) 25 MG TABLET    Take 1 tablet (25 mg total) by mouth every 6 (six) hours as needed (nausea)       Start Date: 10/3/2023 End Date: --       Order Dose: 25 mg       Quantity: 30 tablet    Refills: 0       Outpatient Discharge Orders   hCG, quantitative   Standing Status: Future Standing Exp.  Date: 10/03/24       PDMP Review       None            ED Provider  Electronically Signed by             Pauline Chavez MD  10/03/23 9761

## 2023-10-06 LAB
BACTERIA UR CULT: NORMAL
HCG INTACT+B SERPL-ACNC: 8947 MIU/ML

## 2023-10-13 ENCOUNTER — OFFICE VISIT (OUTPATIENT)
Dept: FAMILY MEDICINE | Facility: CLINIC | Age: 33
End: 2023-10-13
Payer: COMMERCIAL

## 2023-10-13 VITALS
RESPIRATION RATE: 16 BRPM | DIASTOLIC BLOOD PRESSURE: 72 MMHG | OXYGEN SATURATION: 98 % | SYSTOLIC BLOOD PRESSURE: 118 MMHG | HEIGHT: 66 IN | BODY MASS INDEX: 23.75 KG/M2 | WEIGHT: 147.8 LBS | TEMPERATURE: 98.2 F | HEART RATE: 84 BPM

## 2023-10-13 DIAGNOSIS — L50.9 HIVES: Primary | ICD-10-CM

## 2023-10-13 PROCEDURE — 3008F BODY MASS INDEX DOCD: CPT | Performed by: FAMILY MEDICINE

## 2023-10-13 PROCEDURE — 99213 OFFICE O/P EST LOW 20 MIN: CPT | Performed by: FAMILY MEDICINE

## 2023-10-13 RX ORDER — DULOXETIN HYDROCHLORIDE 60 MG/1
CAPSULE, DELAYED RELEASE ORAL
COMMUNITY
Start: 2023-08-01

## 2023-10-13 NOTE — PROGRESS NOTES
Subjective      Patient ID: Christa Grossman is a 33 y.o. female is here for the following:    Hives  - Woke up this morning with hives on her abdomen and upper and lower extremities  - Currently 7 weeks pregnant  - She and her  have a cat sitting by feeding the cats next-door and have had dogs with them for the past 2 weeks while dog sitting  - No respiratory symptoms, no difficulty swallowing  - No other known environmental exposures  - Hives are mildly itchy, but otherwise not very bothersome      The following have been reviewed and updated as appropriate in this visit:          Patient Active Problem List   Diagnosis    Migraine with aura and without status migrainosus, not intractable       Social History     Tobacco Use    Smoking status: Never    Smokeless tobacco: Never   Vaping Use    Vaping Use: Never used   Substance Use Topics    Alcohol use: Yes     Alcohol/week: 1.0 standard drink of alcohol     Types: 1 Glasses of wine per week    Drug use: Never       Allergies as of 10/13/2023 - Reviewed 10/13/2023   Allergen Reaction Noted    Frank Hives 04/02/2015    Sulfa (sulfonamide antibiotics) Hives 04/02/2015         Current Outpatient Medications:     azelaic acid (FINACEA) 15 % foam, Apply topically 2 (two) times a day., Disp: , Rfl:     DULoxetine (CYMBALTA) 60 mg capsule, , Disp: , Rfl:     ivermectin 1 % cream, Apply topically daily., Disp: , Rfl:     metroNIDAZOLE (METROCREAM) 0.75 % cream, 0.75 %., Disp: , Rfl:     multivitamin capsule, Take 1 capsule by mouth daily., Disp: , Rfl:     prazosin (MINIPRESS) 1 mg capsule, TAKE THREE CAPSULES BY MOUTH EVERY EVENING, Disp: , Rfl:     SUMAtriptan (IMITREX) 50 mg tablet, Take 1 tablet (50 mg total) by mouth once as needed for migraine for up to 12 doses. May repeat dose once in 2hr if no relief. Do not exceed 2 doses in 24hr., Disp: 12 tablet, Rfl: 0    metoclopramide (REGLAN) 10 mg tablet, Take 1 tablet (10 mg total) by mouth daily as  "needed (migraine) for up to 10 days., Disp: 10 tablet, Rfl: 0    sertraline (ZOLOFT) 100 mg tablet, TAKE ONE AND ONE-HALF TABLETS BY MOUTH EVERY EVENING, Disp: , Rfl:     Review of systems  Per HPI.    Objective   Vitals:    10/13/23 1400   BP: 118/72   BP Location: Left upper arm   Patient Position: Sitting   Pulse: 84   Resp: 16   Temp: 36.8 °C (98.2 °F)   TempSrc: Temporal   SpO2: 98%   Weight: 67 kg (147 lb 12.8 oz)   Height: 1.689 m (5' 6.5\")     Body mass index is 23.5 kg/m².    Physical Exam  Vitals reviewed.   Constitutional:       General: She is not in acute distress.     Appearance: She is well-developed. She is not toxic-appearing.   Cardiovascular:      Rate and Rhythm: Normal rate and regular rhythm.      Heart sounds: Normal heart sounds.   Pulmonary:      Effort: Pulmonary effort is normal. No respiratory distress.      Breath sounds: Normal breath sounds.   Skin:     Comments: Scattered erythematous macular papular rash on arms, legs, abdomen   Neurological:      Mental Status: She is alert.   Psychiatric:         Mood and Affect: Mood normal.         Behavior: Behavior normal.         Thought Content: Thought content normal.         Judgment: Judgment normal.       No images are attached to the encounter.     Assessment/Plan   Problem List Items Addressed This Visit    None  Visit Diagnoses     Hives    -  Primary  Likely secondary to new topical irritant exposure  Largely asymptomatic aside from mild pruritus  Currently 7 weeks pregnant    - Continue to monitor symptoms, she can use second-generation antihistamines if needed as she is currently pregnant.  She states that she will try to avoid them unless it becomes more obtrusive.  They will also switch their laundry detergent to Dreft or other gentle detergent and follow-up if symptoms not improved          I spent 24 minutes on this date of service performing the following activities: obtaining history, performing examination, documenting and " providing counseling and education.      Benny Voss MD  10/13/2023    Portions of the above dictation were performed using Dragon dictation software.  Please excuse any typos or grammatical errors.

## 2024-01-08 ENCOUNTER — OFFICE VISIT (OUTPATIENT)
Dept: NEUROLOGY | Facility: CLINIC | Age: 34
End: 2024-01-08
Payer: COMMERCIAL

## 2024-01-08 VITALS
TEMPERATURE: 98.5 F | HEIGHT: 67 IN | HEART RATE: 78 BPM | RESPIRATION RATE: 16 BRPM | BODY MASS INDEX: 25.58 KG/M2 | DIASTOLIC BLOOD PRESSURE: 74 MMHG | SYSTOLIC BLOOD PRESSURE: 116 MMHG | OXYGEN SATURATION: 98 % | WEIGHT: 163 LBS

## 2024-01-08 DIAGNOSIS — M54.2 CERVICALGIA: ICD-10-CM

## 2024-01-08 DIAGNOSIS — G43.701 CHRONIC MIGRAINE WITHOUT AURA WITH STATUS MIGRAINOSUS, NOT INTRACTABLE: Primary | ICD-10-CM

## 2024-01-08 DIAGNOSIS — E55.9 VITAMIN D DEFICIENCY: ICD-10-CM

## 2024-01-08 DIAGNOSIS — G43.109 MIGRAINE WITH AURA AND WITHOUT STATUS MIGRAINOSUS, NOT INTRACTABLE: ICD-10-CM

## 2024-01-08 DIAGNOSIS — E53.8 VITAMIN B12 DEFICIENCY: ICD-10-CM

## 2024-01-08 DIAGNOSIS — G44.221 CHRONIC TENSION-TYPE HEADACHE, INTRACTABLE: ICD-10-CM

## 2024-01-08 PROBLEM — F43.10 PTSD (POST-TRAUMATIC STRESS DISORDER): Status: ACTIVE | Noted: 2024-01-08

## 2024-01-08 PROCEDURE — 99205 OFFICE O/P NEW HI 60 MIN: CPT | Performed by: PSYCHIATRY & NEUROLOGY

## 2024-01-08 PROCEDURE — 3008F BODY MASS INDEX DOCD: CPT | Performed by: PSYCHIATRY & NEUROLOGY

## 2024-01-08 RX ORDER — LANOLIN ALCOHOL/MO/W.PET/CERES
400 CREAM (GRAM) TOPICAL DAILY
Qty: 90 TABLET | Refills: 1 | Status: CANCELLED | OUTPATIENT
Start: 2024-01-08 | End: 2024-07-06

## 2024-01-08 RX ORDER — CYPROHEPTADINE HYDROCHLORIDE 4 MG/1
TABLET ORAL
Qty: 30 TABLET | Refills: 1 | Status: SHIPPED | OUTPATIENT
Start: 2024-01-08 | End: 2024-05-20

## 2024-01-08 RX ORDER — RIBOFLAVIN (VITAMIN B2) 400 MG
TABLET ORAL
Qty: 90 TABLET | Refills: 6 | Status: CANCELLED | OUTPATIENT
Start: 2024-01-08

## 2024-01-08 RX ORDER — ASPIRIN 81 MG/1
81 TABLET ORAL DAILY
COMMUNITY
Start: 2023-11-29 | End: 2024-10-11 | Stop reason: ALTCHOICE

## 2024-01-08 NOTE — PATIENT INSTRUCTIONS
Please message us via My Chart with any questions or concerns.    Cervicalgia (neck pain):  - Refer patient to physical therapy for evaluation.  - Consider getting MRI of the cervical spine once patient is delivered  - Neck pathology and poor posture, with straightening of the normal cervical lordosis, can cause headaches.  Tightening of the neck muscles can irritate the nerves in the occipital region of the head and cause or worsen head pain. Thus neck strengthening and relaxation exercises, can help improve this particular pain. It is importance to have good posture for improving shoulder, neck, and head pain.    Continue to do Basic neck exercises on your own on daily basis.  - Here are some exercises which should take 5 minutes:     1. Standing, drop your head to one side while continuing to look ahead. Hold for 10 seconds then swap sides. Repeat twice more each side. To increase the stretch, drop the opposite shoulder.    2. Standing again, lower your chin to your chest, hold for 10 and then look up to the ceiling and hold for 10. Repeat twice more.     3. Next, standing straight again, look over your right shoulder and hold firm for 10 seconds, then over your left shoulder for 10. Repeat this 3 times.     4. Finally, while sitting upright, bring your head forward and hold for 10, then all the way back and hold for 10.    If this simple exercise does not help improve the posture, we will consider formal physical therapy in the future.     Insomnia with sleep disturbance:  - Prazosin 1 mg capsule  - Unisom 25 mg tab (will stop this to try cyproheptadine)  Importance of Healthy Sleep:  Behavioral sleep changes can promote restful, regular sleep and reduce headache. Simple changes like establishing consistent sleep and wake-up times, as well as getting between 7 and 8 hours of sleep a day, can make a world of difference. Experts also recommend avoiding substances that impair sleep, like caffeine, nicotine and  alcohol, and also suggest winding down before bed to prevent sleep problems. To read more go to https://americanmigrainefoundation.org/resource-library/sleep/    Headache secondary to computer screen use: Try Renpho eye massager  Weather related headaches:  Chronic migraine headaches without aura  Migraine headache with aura:    Preventive therapy for headaches:   Recommendation of nutraceuticals, to use for migraine headaches:   - Magnesium Oxide 400mg or magnesium glycinate 400 mg or magnesium complex 225 mg (from [place]) one at bedtime.  If any diarrhea or upset stomach, decrease dose as tolerated.  Start off slow with anyone that you choose and build up on it slowly.  - Cyproheptadine 4 mg quarter of a pill at bedtime nightly increase up as tolerated  -Food to avoid during pregnancy, chocolate, nitrates, MSG, aged cheese, peanut butter.  As these foods can trigger migraine headache and may exacerbate migraine headache as well.    Abortive therapy for headaches:     - At the onset of headache: Tylenol 500 mg max 3 a week    If that fails:    - Next step: May take sumatriptan 50 mg if needed.  Max 3 a week patient is not to use triptan if her blood pressure is elevated.    Other option during pregnancy:    Nerivio:   - It is a  wireless remote electrical neuromodulation device for treatment of migraine with or without aura in patients 14 years of age or older. This prescription device is self-applied to the upper-arm and should be used in the home environment at the onset of migraine headache or aura.  Is it covered by my insurance?  - No it is not.  -The first 18 treatments will cost you 49 dollars ($2.60 per migraine).  Then after that its $89 for the next 18 treatments or if you order 3 at a time then the price is $66 each treatment.    We will send out a prescription to the pharmacy.  They will call or text you before the device is mailed to you.  If you do not hear back from them within 5 to 7 days.  Please  "call this number: [phone].    Headache management instructions  - When patient has a moderate to severe headache, they should seek rest, initiate relaxation and apply cold compresses to the head.   - Maintain regular sleep schedule. Adults need at least 7-8 hours of uninterrupted sleep, per night.   - Limit over the counter medications such as Tylenol, Ibuprofen, Aleve, Excedrin. (No more than 2- 3 times a week or max 10 a month).  - Maintain headache diary.  Free HAMLET for a smart phone, which can be used is \"Migraine wade\"  - Avoid dietary trigger. (aged cheese, peanuts, MSG, aspartame and nitrates).  - Patient is to have regular frequent meals to prevent headache onset.    - Please drink at least 64 ounces of water a day, to help remain hydrated.  - Limit caffeine to 1-2 cups, 8 to 16 oz a day or less.     Cognitive behavioral therapy (CBT):  - Is a common type of talk therapy (psychotherapy) were you work with a psychotherapist or therapist . CBT helps you become aware of inaccurate or negative thinking so you can view challenging situations more clearly and respond to them in a more effective way. CBT can be a very helpful tool either alone or in combination with other therapies in treating mental health disorders and chronic pain. CBT can be an effective tool to help anyone learn how to better manage stressful life situations and pain. In some cases, CBT is most effective when it's combined with other treatments, such as antidepressants or other medications.  You can start yourself by down loading the hamlet: [employer]    Here are a few websites on which you can find certified cognitive behavioral therapists:  Academyofct.org (Academy of cognitive therapists)  Adaa.org (Anxiety and depression association of [place])  Abct.org (associated for behavioral and cognitive therapy)    Mindfulness/Meditation:  - Many people believe that stress is a major trigger for their pain. This is where mindfulness and meditation " can come into play, as they have been known to help reduce migraine severity, duration and acute pain medication use. It may also help to relieve stress and anxiety while improving feelings of well being.    Biofeedback:   - Involves becoming more aware of the changes that occur in the body and learning how to exert control over generally involuntary functions. Biofeedback allows you to see your vitals in real-time and learn how to stabilize them on your own. There is great evidence that biofeedback can reduce the frequency, intensity, and duration of pain.   When you're stressed, you may notice elevated heart rates, tightened muscles, and sweating.  During biofeedback, you can see these changes on a monitor, then a therapist teaches you exercises to help manage these changes.    Yoga/Ganesh Chi:  - The kind of mind/body therapy that yoga can provide may help create relief from pain. Keeping up with yoga consistently can reduce headache frequency, intensity and duration, so it's important to practice regularly if you plan to use it as a complementary migraine treatment. However, certain types of yoga such as “ hot yoga”   may be uncomfortable for people with migraine. Others, such as “ restorative yoga,” may be tolerable even for a patient with chronic migraine.  Ganesh Chi can also have a similar benefit for patients with migraine. Specifically, it can help improve balance, which can be very useful for those with vestibular symptoms or vestibular migraine.    Acupuncture:  - A traditional Chinese medicine, acupuncture is reported to increase the release of serotonin, dopamine and other chemicals that may help to treat chronic pain, and can be helpful in preventing episodic migraine. There are, however, conflicting results on studies in acupuncture as a treatment for migraine.    Exercising:    - Regular exercise can reduce the frequency and intensity pain. When one exercises, the body releases endorphins, which are the  body's natural painkillers. Exercise reduces stress and helps individuals to sleep at night. Exercising at least 30 to 40 minutes 3 times a week is sufficient for most patients.   When exercising, follow this plan:  - First, stay hydrated before, during, and after exercise.    - Second part of the exercise plan is to eat sufficient food about an hour and a half before you exercise. Exercise causes one's blood sugar level to decrease, and it is important to have a source of energy.   - Final part of the exercise plan is to warm-up. Do not jump into sudden, vigorous exercise if that triggers a headache or migraine.       To read more go to: https://americanmigrainefoundation.org/resource-library/effects-of-exercise-on-headaches-and-migraines

## 2024-01-08 NOTE — PROGRESS NOTES
[facility]   Headache [facility]  Geeta Pagan MD  [address] ([address])  [place] [address]       Patient ID: Christa Grossman    : 1990  MRN: [id number]                                            Visit Date: 2024  Encounter Provider: Geeta Pagan  Referring Provider: Emmy Lopez MD           Assessment/Plan   Problem List Items Addressed This Visit        Nervous    Cervicalgia    Relevant Orders    Ambulatory referral to Physical Therapy    Chronic tension-type headache, intractable       Other    Chronic migraine without aura with status migrainosus, not intractable - Primary    Relevant Orders    ECG 12 lead    Migraine with aura and without status migrainosus, not intractable   Other Visit Diagnoses     Vitamin D deficiency        Relevant Orders    Vitamin D 25 hydroxy    Vitamin B12 deficiency        Relevant Orders    Vitamin B12          Please message us via My Chart with any questions or concerns.    Cervicalgia (neck pain):  - Refer patient to physical therapy for evaluation.  - Consider getting MRI of the cervical spine once patient is delivered  - Neck pathology and poor posture, with straightening of the normal cervical lordosis, can cause headaches.  Tightening of the neck muscles can irritate the nerves in the occipital region of the head and cause or worsen head pain. Thus neck strengthening and relaxation exercises, can help improve this particular pain. It is importance to have good posture for improving shoulder, neck, and head pain.    Continue to do Basic neck exercises on your own on daily basis.  - Here are some exercises which should take 5 minutes:     1. Standing, drop your head to one side while continuing to look ahead. Hold for 10 seconds then swap sides. Repeat twice more each side. To increase the stretch, drop the opposite shoulder.    2. Standing again, lower your chin to your chest, hold for 10 and then look up to the ceiling  and hold for 10. Repeat twice more.     3. Next, standing straight again, look over your right shoulder and hold firm for 10 seconds, then over your left shoulder for 10. Repeat this 3 times.     4. Finally, while sitting upright, bring your head forward and hold for 10, then all the way back and hold for 10.    If this simple exercise does not help improve the posture, we will consider formal physical therapy in the future.     Insomnia:  - Prazosin 1 mg capsule  - Unisom 25 mg tab (will stop this to try cyproheptadine)  Importance of Healthy Sleep:  Behavioral sleep changes can promote restful, regular sleep and reduce headache. Simple changes like establishing consistent sleep and wake-up times, as well as getting between 7 and 8 hours of sleep a day, can make a world of difference. Experts also recommend avoiding substances that impair sleep, like caffeine, nicotine and alcohol, and also suggest winding down before bed to prevent sleep problems. To read more go to https://americanmigrainefoundation.org/resource-library/sleep/    Headache secondary to computer screen use: Try Renpho eye massager  Weather related headaches:  Chronic migraine headaches without aura  Migraine headache with aura:    Preventive therapy for headaches:   Recommendation of nutraceuticals, to use for migraine headaches:   - Magnesium Oxide 400mg or magnesium glycinate 400 mg or magnesium complex 225 mg (from [place]) one at bedtime.  If any diarrhea or upset stomach, decrease dose as tolerated.  Start off slow with anyone that you choose and build up on it slowly.  - Cyproheptadine 4 mg quarter of a pill at bedtime nightly increase up as tolerated  -Food to avoid during pregnancy, chocolate, nitrates, MSG, aged cheese, peanut butter.  As these foods can trigger migraine headache and may exacerbate migraine headache as well.    Abortive therapy for headaches:     - At the onset of headache: Tylenol 500 mg max 3 a week    If that fails:    -  "Next step: May take sumatriptan 50 mg if needed.  Max 3 a week patient is not to use triptan if her blood pressure is elevated.    Other option during pregnancy:    Nerivio:   - It is a  wireless remote electrical neuromodulation device for treatment of migraine with or without aura in patients 14 years of age or older. This prescription device is self-applied to the upper-arm and should be used in the home environment at the onset of migraine headache or aura.  Is it covered by my insurance?  - No it is not.  -The first 18 treatments will cost you 49 dollars ($2.60 per migraine).  Then after that its $89 for the next 18 treatments or if you order 3 at a time then the price is $66 each treatment.    We will send out a prescription to the pharmacy.  They will call or text you before the device is mailed to you.  If you do not hear back from them within 5 to 7 days.  Please call this number: [phone].    Headache management instructions  - When patient has a moderate to severe headache, they should seek rest, initiate relaxation and apply cold compresses to the head.   - Maintain regular sleep schedule. Adults need at least 7-8 hours of uninterrupted sleep, per night.   - Limit over the counter medications such as Tylenol, Ibuprofen, Aleve, Excedrin. (No more than 2- 3 times a week or max 10 a month).  - Maintain headache diary.  Free [place] for a smart phone, which can be used is \"Migraine wade\"  - Avoid dietary trigger. (aged cheese, peanuts, MSG, aspartame and nitrates).  - Patient is to have regular frequent meals to prevent headache onset.    - Please drink at least 64 ounces of water a day, to help remain hydrated.  - Limit caffeine to 1-2 cups, 8 to 16 oz a day or less.       Return in about 6 months (around 7/8/2024).         _________________________________________________________________      Chief Complaint: Headache      Subjective     We had the pleasure of evaluating Christa Grossman in neurological " consultation today. As you know she  is a 33 y.o.  years old  right handed female.  she is here today for evaluation of migraines.  Work history: Has masters and is a   Personal history:  2020 and currently pregnant with her first pregnancy (19 weeks)    Of note: Patient delivery date is Kimber 3, 2024.      Medical history review:   QTC: None in chart review  Tobacco use: No  Vaping: No  Alcohol use: None at this time  Daily Caffeine intake?  None at this time  Daily water intake?  80 oz  BP Readings from Last 3 Encounters:   01/08/24 116/74   10/13/23 118/72   08/11/23 118/78       Psychiatry history review:  PTSD (2021 from her last job)  Depression: No   Anxiety: No  Seeing a psychiatrist: No  Seeing at therapist: Yes was seeing a therapist for PTSD      Headache/pain history:  Family history of migraines?  Father with migraine headache with and without aura, mother with migraine headache with aura, brother with migraine headache with aura.    Family history of aneurysms?  No   Family history of any other neurological disease?  Maternal grandmother with Alzheimer's disease,    Have you seen someone else for headaches or pain?  No    Symptoms started at what age?  At age 11-migraine and temporomandibular joint pain after traumatic orthodontic work.  Migraine headache frequency increased in 2021..  Went to see a new primary care physician for migraine headaches and from that reported states patient had 3 migraine headache and the last 3 weeks and before that had not had a migraine for 9 months.     Got pregnant in September.     What is your current pain level?   Headache:0/10  Neck pain:0/10    How often do the symptoms occur?   Mild headaches: daily since elementary school  Moderate  headaches: during pregnancy: 3 a week, and before that 3 a week - since 2021  Severe headaches: none during pregnancy, 1-2 a month during summer of 2023, then would have cluster that occurs once in a while.   Neck  pain/soreness: varies from day to day due to stress - since 2021    Are you ever symptom free? Not for mild headaches    What is the intensity of symptoms?   Mild headaches: 1-4/10  Moderate headaches: 5-7/10  Severe headaches:  10/10 or may go down to 8-9/10  Neck pain: 1/7/10    How long do the symptoms last?   Mild headaches: baseline pain  Moderate headaches: varies due to trigger - if can eat it gets better and may last for 12 hours to 4 days  Severe headaches:  2 to 12 hours  Neck pain:30 minutes to 10 hours    How often do you take abortive medication?  Over-the-counter medication: 3 times a week tylenol during the pregnancy and before pregnancy Advil 3 times a week  Migraine specific medication: sumatriptan    What time of the day do symptoms start?  Mild headaches: baseline pain  Moderate headaches: mid afternoon or evening  Severe headaches:  anytime  Neck pain: anytime    Describe your usual symptoms?   Mild headaches: dull, aching, pressure  Moderate headaches: pounding and throbbing,   Severe headaches:  Pounding, throbbing, stabbing and sharp  Neck pain: Soreness, stiffness, at time sharp shooting pain radiating into her occipital region    Where are your symptoms located?   Mild headaches: diffuse pain, or anywhere in her head  Moderate headaches: Anywhere in headaches  Severe headaches:  Anywhere in her head  Neck pain: bilateral neck and starts at the base of her neck and radiates to the upper shoulder    Aura/Warning/Prodrome?   Aura onset at age: 4th grade    Central scotoma: Which was started off small and slowly enlarge.  She felt as if she could look through it as it was blurred.  Left visual field more than right.    Duration: 45 minutes.    Migraine aura pattern change in 2020    New onset aura: Scintillation scotoma and once the headache starts during her migraine headaches she will have numbness that starts from fifth digit and moves across her fingers onto the thumb.  And may have numbness  and tingling of all fingers or the hand.  Could be in the right hand or the left hand.  Accompanied with numbness and tingling in the orbicularis auris region starting from one end of the mouth to the other. This lasts for about 2 minutes.  Duration: 45 minutes  2023:  - 5 migraine aura    Associated symptoms with headache or neck pain:   - Decrease appetite, Nausea, Vomiting, Diarrhea   - Photophobia, Phonophobia,   - Insomnia   - Stiff or sore neck  - Problems with concentration   - Hands or feet tingle or feel numb/paresthesias   - Better with lying down   - Prefer to be in a cool, quiet, dark room     Headache triggers: Stress, tension, weather change, lack of sleep, missing meals, dehydration    Sleep Habit:   How many hours do you actually sleep?  7 to 9 hours  Do you snore and or have been told that you stop breathing during sleeping?  No  Do you grind/clench your teeth at night?  Yes-wears a nightguard  Do you have jaw pain?  Yes    Previous/current treatments for headaches/pain/mood:   CBD or THC: NO   Interventional procedure: NO   Alternative therapies: yoga  Headache devices: Nerivio , Cefaly, Gamma cor, Tens units - none  Trigger point injection/Nerve block:NO   Botulinum toxin: NO   Headache infusions:NO   Preventive medication:   - Multivitamin,  - Zoloft 100 mg tablet, Cymbalta 60 mg capsule,  - Metoprolol 25 mg tablet,  Abortive medication:   - Sumatriptan 50 mg,  - Tylenol,  - Ibuprofen,  - Prednisone 20 mg  - Reglan 10 mg tablet,    - I personally reviewed old notes over the last few months     Imaging/work-up:    Lab Results   Component Value Date    GLUCOSE 90 02/11/2022     02/11/2022    K 4.0 02/11/2022    CO2 23 02/11/2022     02/11/2022    BUN 10 02/11/2022    EGFR 118 02/11/2022    EGFR 136 02/11/2022    CREATININE 0.69 02/11/2022    CA 9.0 02/11/2022    TSH 1.540 02/11/2022       Lab Results   Component Value Date    TSH 1.540 02/11/2022       Lab Results   Component Value Date     GLUCOSE 90 02/11/2022    BUN 10 02/11/2022    CREATININE 0.69 02/11/2022    EGFR 118 02/11/2022    EGFR 136 02/11/2022    BUNCREAT 14 02/11/2022     02/11/2022    K 4.0 02/11/2022     02/11/2022    CO2 23 02/11/2022    CA 9.0 02/11/2022       Lab Results   Component Value Date     02/11/2022    K 4.0 02/11/2022     02/11/2022    CO2 23 02/11/2022    BUN 10 02/11/2022    CREATININE 0.69 02/11/2022    GLUCOSE 90 02/11/2022    EGFR 118 02/11/2022    EGFR 136 02/11/2022       Medications:   Current Outpatient Medications:   •  aspirin 81 mg enteric coated tablet, Take 81 mg by mouth daily., Disp: , Rfl:   •  azelaic acid (FINACEA) 15 % foam, Apply topically 2 (two) times a day., Disp: , Rfl:   •  doxylamine (UNISOM) 25 mg tablet, Take 12.5 mg by mouth., Disp: , Rfl:   •  DULoxetine (CYMBALTA) 60 mg capsule, 70mg, Disp: , Rfl:   •  metoclopramide (REGLAN) 10 mg tablet, Take 1 tablet (10 mg total) by mouth daily as needed (migraine) for up to 10 days., Disp: 10 tablet, Rfl: 0  •  multivitamin capsule, Take 1 capsule by mouth daily., Disp: , Rfl:   •  prazosin (MINIPRESS) 1 mg capsule, TAKE THREE CAPSULES BY MOUTH EVERY EVENING, Disp: , Rfl:     Past Medical History:  has a past medical history of Migraines, PTSD (post-traumatic stress disorder), and Rosacea.    Past Surgical History:  has no past surgical history on file.    Social History:   Social History     Tobacco Use   • Smoking status: Never   • Smokeless tobacco: Never   Vaping Use   • Vaping Use: Never used   Substance Use Topics   • Alcohol use: Yes     Alcohol/week: 1.0 standard drink of alcohol     Types: 1 Glasses of wine per week   • Drug use: Never       Family History: family history includes Alzheimer's disease in her maternal grandmother; Colon cancer in her maternal grandmother and paternal grandmother; Heart disease in her biological father, maternal grandfather, and paternal grandfather; Hypothyroidism in her biological  "sister.    Allergies: is allergic to lee, sulfa (sulfonamide antibiotics), cat dander, and dog dander.     Review of Systems  All other systems reviewed and negative except as noted in the HPI.      The following have been reviewed and updated as appropriate in this visit:   Allergies  Meds  Problems         Objective   Physical Exam:    Visit Vitals  /74 (BP Location: Left upper arm, Patient Position: Sitting)   Pulse 78   Temp 36.9 °C (98.5 °F)   Resp 16   Ht 1.702 m (5' 7\")   Wt 73.9 kg (163 lb)   SpO2 98%   BMI 25.53 kg/m²         Musculoskeletal: - Range of motion - WNL     Behavior/Emotional: Appropriate, cooperative     Neurologic Exam:  Alert and oriented.       CN: Intact    Motor: Moving all extremities without any difficulty.    Sensory examination: Normal to light touch     Coordination: mild tremor noted- right more then left, finger-to-nose intact    Reflexes: 3/4 upper and 4/4 lower extremity.       Gait: Was narrow based.  Positive Romberg, Bushra I. Malik, MD    I spent 70 minutes on this date of service performing the following activities: obtaining history, performing examination, entering orders, documenting, preparing for visit, obtaining / reviewing records, providing counseling and education and independently reviewing study/studies.   "

## 2024-05-14 ENCOUNTER — TELEPHONE (OUTPATIENT)
Dept: NEUROLOGY | Facility: CLINIC | Age: 34
End: 2024-05-14
Payer: COMMERCIAL

## 2024-05-14 NOTE — TELEPHONE ENCOUNTER
[facility] Appointment Request   Provider: Ej  Appointment Type: OV  Reason for Visit: Medication  Available Day and Time: Anytime prior to 7.8.24  Best Contact Number: [phone]    The practice will reach out to schedule your appointment within the next 2 business days.

## 2024-05-15 DIAGNOSIS — G43.109 MIGRAINE WITH AURA AND WITHOUT STATUS MIGRAINOSUS, NOT INTRACTABLE: Primary | ICD-10-CM

## 2024-05-15 RX ORDER — METOCLOPRAMIDE 10 MG/1
10 TABLET ORAL DAILY PRN
Qty: 10 TABLET | Refills: 0 | Status: SHIPPED | OUTPATIENT
Start: 2024-05-15 | End: 2024-05-20 | Stop reason: SDUPTHER

## 2024-05-15 NOTE — TELEPHONE ENCOUNTER
Sent myChart msg to patient to offer Thursday 5/16 at 3:30pm or Monday 5/20 at 11:30am with Dr. Pagan for an in-office visit. Thank you!

## 2024-05-15 NOTE — TELEPHONE ENCOUNTER
"Medicine Refill Request    Last Office Visit: 10/13/2023   Last Consult Visit: Visit date not found  Last Telemedicine Visit: Visit date not found    Next Appointment: Visit date not found      Current Outpatient Medications:     aspirin 81 mg enteric coated tablet, Take 81 mg by mouth daily., Disp: , Rfl:     azelaic acid (FINACEA) 15 % foam, Apply topically 2 (two) times a day., Disp: , Rfl:     cyproheptadine (PERIACTIN) 4 mg tablet, One tab or 1/2 a tab at bedtime nightly or as needed for weather related headaches, Disp: 30 tablet, Rfl: 1    doxylamine (UNISOM) 25 mg tablet, Take 12.5 mg by mouth., Disp: , Rfl:     DULoxetine (CYMBALTA) 60 mg capsule, 70mg, Disp: , Rfl:     metoclopramide (REGLAN) 10 mg tablet, Take 1 tablet (10 mg total) by mouth daily as needed (migraine) for up to 10 days., Disp: 10 tablet, Rfl: 0    multivitamin capsule, Take 1 capsule by mouth daily., Disp: , Rfl:     prazosin (MINIPRESS) 1 mg capsule, TAKE THREE CAPSULES BY MOUTH EVERY EVENING, Disp: , Rfl:       BP Readings from Last 3 Encounters:   01/08/24 116/74   10/13/23 118/72   08/11/23 118/78       Recent Lab results:  No results found for: \"CHOL\", No results found for: \"HDL\", No results found for: \"LDLCALC\", No results found for: \"TRIG\"     Lab Results   Component Value Date    GLUCOSE 90 02/11/2022   , No results found for: \"HGBA1C\"      Lab Results   Component Value Date    CREATININE 0.69 02/11/2022       Lab Results   Component Value Date    TSH 1.540 02/11/2022         No results found for: \"HGBA1C\"  "

## 2024-05-20 ENCOUNTER — OFFICE VISIT (OUTPATIENT)
Dept: NEUROLOGY | Facility: CLINIC | Age: 34
End: 2024-05-20
Payer: COMMERCIAL

## 2024-05-20 ENCOUNTER — APPOINTMENT (OUTPATIENT)
Dept: LAB | Facility: CLINIC | Age: 34
End: 2024-05-20
Attending: PSYCHIATRY & NEUROLOGY
Payer: COMMERCIAL

## 2024-05-20 VITALS
DIASTOLIC BLOOD PRESSURE: 80 MMHG | TEMPERATURE: 97.8 F | RESPIRATION RATE: 18 BRPM | WEIGHT: 156 LBS | BODY MASS INDEX: 24.43 KG/M2 | SYSTOLIC BLOOD PRESSURE: 110 MMHG | OXYGEN SATURATION: 98 % | HEART RATE: 80 BPM

## 2024-05-20 DIAGNOSIS — G43.701 CHRONIC MIGRAINE WITHOUT AURA WITH STATUS MIGRAINOSUS, NOT INTRACTABLE: Primary | ICD-10-CM

## 2024-05-20 DIAGNOSIS — G44.221 CHRONIC TENSION-TYPE HEADACHE, INTRACTABLE: ICD-10-CM

## 2024-05-20 DIAGNOSIS — E53.8 VITAMIN B12 DEFICIENCY: ICD-10-CM

## 2024-05-20 DIAGNOSIS — G43.109 MIGRAINE WITH AURA AND WITHOUT STATUS MIGRAINOSUS, NOT INTRACTABLE: ICD-10-CM

## 2024-05-20 DIAGNOSIS — E55.9 VITAMIN D DEFICIENCY: ICD-10-CM

## 2024-05-20 DIAGNOSIS — M54.2 CERVICALGIA: ICD-10-CM

## 2024-05-20 PROCEDURE — 36415 COLL VENOUS BLD VENIPUNCTURE: CPT

## 2024-05-20 PROCEDURE — 82607 VITAMIN B-12: CPT

## 2024-05-20 PROCEDURE — 82306 VITAMIN D 25 HYDROXY: CPT

## 2024-05-20 PROCEDURE — 99214 OFFICE O/P EST MOD 30 MIN: CPT | Performed by: PSYCHIATRY & NEUROLOGY

## 2024-05-20 PROCEDURE — 3008F BODY MASS INDEX DOCD: CPT | Performed by: PSYCHIATRY & NEUROLOGY

## 2024-05-20 RX ORDER — CYPROHEPTADINE HYDROCHLORIDE 4 MG/1
TABLET ORAL
Qty: 30 TABLET | Refills: 3 | Status: SHIPPED | OUTPATIENT
Start: 2024-05-20 | End: 2024-10-11 | Stop reason: SDUPTHER

## 2024-05-20 RX ORDER — ALMOTRIPTAN 12.5 MG/1
12.5 TABLET, FILM COATED ORAL ONCE AS NEEDED
Qty: 12 TABLET | Refills: 3 | Status: SHIPPED | OUTPATIENT
Start: 2024-05-20 | End: 2024-10-11 | Stop reason: SDUPTHER

## 2024-05-20 RX ORDER — METOCLOPRAMIDE 10 MG/1
10 TABLET ORAL DAILY PRN
Qty: 15 TABLET | Refills: 1 | Status: SHIPPED | OUTPATIENT
Start: 2024-05-20 | End: 2024-07-08

## 2024-05-20 RX ORDER — FERROUS SULFATE 325(65) MG
1 TABLET, DELAYED RELEASE (ENTERIC COATED) ORAL EVERY OTHER DAY
COMMUNITY
Start: 2024-04-30 | End: 2024-10-11 | Stop reason: ALTCHOICE

## 2024-05-20 RX ORDER — DEXAMETHASONE 1 MG/1
TABLET ORAL
Qty: 10 TABLET | Refills: 1 | Status: SHIPPED | OUTPATIENT
Start: 2024-05-20

## 2024-05-20 RX ORDER — NIFEDIPINE 30 MG/1
30 TABLET, EXTENDED RELEASE ORAL DAILY
COMMUNITY
Start: 2024-05-01 | End: 2024-10-11 | Stop reason: ALTCHOICE

## 2024-05-20 NOTE — PATIENT INSTRUCTIONS
Please message us via My Chart with any questions or concerns.    Chronic migraine headaches without aura  Migraine headache with aura:    Preventive therapy for headaches:      - Magnesium Oxide 100mg one at bedtime  - B2 100 mg 1 in a.m.  - Cyproheptadine 4 mg quarter of a pill at bedtime nightly increase up as tolerated  -Food to avoid chocolate, nitrates, MSG, aged cheese, peanut butter.     Abortive therapy for headaches:     - At the onset of headache: Axert    If that fails in 1 to 2 hours:    Next step: Reglan 10 mg    If this fails in 2 hours:    - Next step: Decadron 1 mg (with this pump and dump for 24 hours)

## 2024-05-20 NOTE — PROGRESS NOTES
[facility]   [facility]  Geeta Pagan MD  [address] ([address])  [place] [address]       Patient ID: Christa Grossman    : 1990  MRN: [id number]                                            Visit Date: 2024  Encounter Provider: Geeta Pagan  Referring Provider: No ref. provider found           Assessment/Plan   Problem List Items Addressed This Visit          Nervous    Cervicalgia    Chronic tension-type headache, intractable    Relevant Medications    NIFEdipine XL (PROCARDIA XL) 30 mg 24 hr tablet    almotriptan (AXERT) 12.5 mg tablet       Other    Chronic migraine without aura with status migrainosus, not intractable - Primary    Relevant Medications    NIFEdipine XL (PROCARDIA XL) 30 mg 24 hr tablet    cyproheptadine (PERIACTIN) 4 mg tablet    almotriptan (AXERT) 12.5 mg tablet    metoclopramide (REGLAN) 10 mg tablet    dexAMETHasone (DECADRON) 1 mg tablet    Migraine with aura and without status migrainosus, not intractable    Relevant Medications    NIFEdipine XL (PROCARDIA XL) 30 mg 24 hr tablet    cyproheptadine (PERIACTIN) 4 mg tablet    almotriptan (AXERT) 12.5 mg tablet    metoclopramide (REGLAN) 10 mg tablet    dexAMETHasone (DECADRON) 1 mg tablet     Other Visit Diagnoses       Vitamin D deficiency        Relevant Orders    Vitamin D 25 hydroxy    Vitamin B12 deficiency        Relevant Orders    Vitamin B12              Please message us via My Chart with any questions or concerns.    Chronic migraine headaches without aura  Migraine headache with aura:    Preventive therapy for headaches:      - Magnesium Oxide 100mg one at bedtime  - B2 100 mg 1 in a.m.  - Cyproheptadine 4 mg quarter of a pill at bedtime nightly increase up as tolerated  -Food to avoid chocolate, nitrates, MSG, aged cheese, peanut butter.     Abortive therapy for headaches:     - At the onset of headache: Axert (if this fails consider Amerge and then rizatriptan).    If that  fails in 1 to 2 hours:    Next step: Reglan 10 mg    If this fails in 2 hours:    - Next step: Decadron 1 mg (with this pump and dump for 24 hours)      Cervicalgia (neck pain):  - Consider getting MRI of the cervical spine once patient is delivered      Insomnia:  - Prazosin 1 mg capsule  - Unisom 25 mg tab       No follow-ups on file.         _________________________________________________________________      Chief Complaint: Follow-up      Subjective     We had the pleasure of evaluating Christa Grossman in neurological follow up today. As you know she  is a 34 y.o.  years old  right handed female.  she is here today for evaluation of migraines.  Work history: Has masters and is a   Personal history:   and delivered via  on 2024 delivered early her previous due in .  Patient is due date was Kimber 3, 2024    Medical history review: She  QTC: None in chart review  Preeclampsia during first pregnancy    Psychiatry history review:  PTSD ( from her last job)  Depression: No   Anxiety: No  Seeing a psychiatrist: No  Seeing at therapist: Yes was seeing a therapist for PTSD      Headache/pain history:  Symptoms started at what age?  At age 11-migraine and temporomandibular joint pain after traumatic orthodontic work.  Migraine headache frequency increased in ..  Went to see a new primary care physician for migraine headaches and from that reported states patient had 3 migraine headache and the last 3 weeks and before that had not had a migraine for 9 months.     Previous/current treatments for headaches/pain/mood:   CBD or THC: NO   Interventional procedure: NO   Alternative therapies: yoga  Headache devices: Nerivio , Cefaly, Gamma cor, Tens units - none  Trigger point injection/Nerve block:NO   Botulinum toxin: NO   Headache infusions:NO   Preventive medication:   - Multivitamin,  - Zoloft 100 mg tablet, Cymbalta 60 mg capsule,  - Metoprolol 25 mg tablet,  Abortive  medication:   - Sumatriptan 50 mg,  - Tylenol,  - Ibuprofen,  - Prednisone 20 mg  - Reglan 10 mg tablet,    FOLLOW-UP CLINIC NOTE:  First date seen: 2024  Last procedure date: None    2024: Since last seen patient states that she delivered on  via  due to complications from preeclampsia.  Baby was originally due on Kimber 3.  She was born at 34 weeks.  Just within the last 2 weeks patient has had 4 migraine headaches.  She has been taking Reglan for that which does help.  Baby is now home from NICU as well.  Reports that she had no migraine headaches during pregnancy.  States that she did have aura with her migraine headache and one of them lasted for 2 hours typically they last less than 1 hour.  However the headaches that she had now were much less intense.    What is your current pain level?   Headache:2-3/10  Neck pain:-0/10    How often do the symptoms occur?   Mild headaches: daily since elementary school  Moderate  headaches: during pregnancy: 3 a week, and before that 3 a week - since   Severe headaches: none during pregnancy, 1-2 a month during summer of , then would have cluster that occurs once in a while.   Neck pain/soreness: varies from day to day due to stress - since     What is the intensity of symptoms?   Mild headaches: 1-4/10  Moderate headaches: 5-7/10  Severe headaches:  10/10 or may go down to 8-9/10  Neck pain: 1/7/10    How long do the symptoms last?   Mild headaches: baseline pain  Moderate headaches: varies due to trigger - if can eat it gets better and may last for 12 hours to 4 days  Severe headaches:  2 to 12 hours  Neck pain:30 minutes to 10 hours    What time of the day do symptoms start?  Mild headaches: baseline pain  Moderate headaches: mid afternoon or evening  Severe headaches:  anytime  Neck pain: anytime    Describe your usual symptoms?   Mild headaches: dull, aching, pressure  Moderate headaches: pounding and throbbing,   Severe headaches:   Pounding, throbbing, stabbing and sharp  Neck pain: Soreness, stiffness, at time sharp shooting pain radiating into her occipital region    Where are your symptoms located?   Mild headaches: diffuse pain, or anywhere in her head  Moderate headaches: Anywhere in headaches  Severe headaches:  Anywhere in her head  Neck pain: bilateral neck and starts at the base of her neck and radiates to the upper shoulder    Aura/Warning/Prodrome?   Aura onset at age: 4th grade  Central scotoma: Which was started off small and slowly enlarge.  She felt as if she could look through it as it was blurred.  Left visual field more than right.    Duration: 45 minutes.    Migraine aura pattern change in 2020    New onset aura: Scintillation scotoma and once the headache starts during her migraine headaches she will have numbness that starts from fifth digit and moves across her fingers onto the thumb.  And may have numbness and tingling of all fingers or the hand.  Could be in the right hand or the left hand.  Accompanied with numbness and tingling in the orbicularis auris region starting from one end of the mouth to the other. This lasts for about 2 minutes.  Duration: 45 minutes  2023:  - 5 migraine aura    2024: aura with each migraine and they lasted longer then her typical 30 minutes time   One lasted for 2 hours      Associated symptoms with headache or neck pain:   - Decrease appetite, Nausea, Vomiting, Diarrhea   - Photophobia, Phonophobia,   - Insomnia   - Stiff or sore neck  - Problems with concentration   - Hands or feet tingle or feel numb/paresthesias   - Better with lying down   - Prefer to be in a cool, quiet, dark room     Headache triggers: Stress, tension, weather change, lack of sleep, missing meals, dehydration    - I personally reviewed old notes over the last few months     Imaging/work-up:    Lab Results   Component Value Date    GLUCOSE 90 02/11/2022     02/11/2022    K 4.0 02/11/2022    CO2 23 02/11/2022      02/11/2022    BUN 10 02/11/2022    EGFR 118 02/11/2022    EGFR 136 02/11/2022    CREATININE 0.69 02/11/2022    CA 9.0 02/11/2022    TSH 1.540 02/11/2022       Lab Results   Component Value Date    TSH 1.540 02/11/2022       Lab Results   Component Value Date    GLUCOSE 90 02/11/2022    BUN 10 02/11/2022    CREATININE 0.69 02/11/2022    EGFR 118 02/11/2022    EGFR 136 02/11/2022    BUNCREAT 14 02/11/2022     02/11/2022    K 4.0 02/11/2022     02/11/2022    CO2 23 02/11/2022    CA 9.0 02/11/2022       Lab Results   Component Value Date     02/11/2022    K 4.0 02/11/2022     02/11/2022    CO2 23 02/11/2022    BUN 10 02/11/2022    CREATININE 0.69 02/11/2022    GLUCOSE 90 02/11/2022    EGFR 118 02/11/2022    EGFR 136 02/11/2022       Medications:   Current Outpatient Medications:     almotriptan (AXERT) 12.5 mg tablet, Take 1 tablet (12.5 mg total) by mouth once as needed for migraine. May repeat in 2 hours if unresolved. Do not exceed 25 mg in 24 hours., Disp: 12 tablet, Rfl: 3    azelaic acid (FINACEA) 15 % foam, Apply topically 2 (two) times a day., Disp: , Rfl:     cyproheptadine (PERIACTIN) 4 mg tablet, One tab or 1/2 a tab at bedtime nightly or as needed for weather related headaches, Disp: 30 tablet, Rfl: 3    dexAMETHasone (DECADRON) 1 mg tablet, One at the onset of mod to severe headaches with food, Disp: 10 tablet, Rfl: 1    doxylamine (UNISOM) 25 mg tablet, Take 12.5 mg by mouth., Disp: , Rfl:     DULoxetine (CYMBALTA) 60 mg capsule, 70mg, Disp: , Rfl:     ferrous sulfate 325 mg (65 mg iron) EC tablet, Take 1 tablet by mouth every other day., Disp: , Rfl:     metoclopramide (REGLAN) 10 mg tablet, Take 1 tablet (10 mg total) by mouth daily as needed (migraine)., Disp: 15 tablet, Rfl: 1    multivitamin capsule, Take 1 capsule by mouth daily., Disp: , Rfl:     NIFEdipine XL (PROCARDIA XL) 30 mg 24 hr tablet, Take 30 mg by mouth daily., Disp: , Rfl:     prazosin (MINIPRESS) 1 mg  capsule, TAKE THREE CAPSULES BY MOUTH EVERY EVENING, Disp: , Rfl:     aspirin 81 mg enteric coated tablet, Take 81 mg by mouth daily., Disp: , Rfl:     Past Medical History:  has a past medical history of Migraines, PTSD (post-traumatic stress disorder), and Rosacea.    Past Surgical History:  has a past surgical history that includes  section.    Social History:   Social History     Tobacco Use    Smoking status: Never    Smokeless tobacco: Never   Vaping Use    Vaping Use: Never used   Substance Use Topics    Alcohol use: Yes     Alcohol/week: 1.0 standard drink of alcohol     Types: 1 Glasses of wine per week    Drug use: Never       Family History: family history includes Alzheimer's disease in her maternal grandmother; Colon cancer in her maternal grandmother and paternal grandmother; Heart disease in her biological father, maternal grandfather, and paternal grandfather; Hypothyroidism in her biological sister.    Allergies: is allergic to lee, sulfa (sulfonamide antibiotics), cat dander, and dog dander.     Review of Systems  All other systems reviewed and negative except as noted in the HPI.      The following have been reviewed and updated as appropriate in this visit:   Allergies  Meds  Problems         Objective   Physical Exam:    Visit Vitals  /80 (BP Location: Left upper arm, Patient Position: Sitting)   Pulse 80   Temp 36.6 °C (97.8 °F)   Resp 18   Wt 70.8 kg (156 lb)   SpO2 98%   BMI 24.43 kg/m²           Musculoskeletal: - Range of motion - WNL     Behavior/Emotional: Appropriate, cooperative     Neurologic Exam:  Alert and oriented.       CN: Intact    Motor: Moving all extremities without any difficulty.    Sensory examination: Normal to light touch     Coordination: mild tremor noted- right more then left, finger-to-nose intact    Reflexes: 3/4 upper and 4/4 lower extremity.       Gait: Was narrow based.  Positive Romberg, Bushra I. Malik, MD    I spent 70 minutes on  this date of service performing the following activities: obtaining history, performing examination, entering orders, documenting, preparing for visit, obtaining / reviewing records, providing counseling and education and independently reviewing study/studies.

## 2024-05-21 LAB
25(OH)D3 SERPL-MCNC: 39 NG/ML (ref 30–100)
VIT B12 SERPL-MCNC: 388 PG/ML (ref 180–914)

## 2024-07-08 ENCOUNTER — OFFICE VISIT (OUTPATIENT)
Dept: NEUROLOGY | Facility: CLINIC | Age: 34
End: 2024-07-08
Payer: COMMERCIAL

## 2024-07-08 VITALS
WEIGHT: 157.6 LBS | HEIGHT: 67 IN | HEART RATE: 78 BPM | OXYGEN SATURATION: 99 % | RESPIRATION RATE: 16 BRPM | DIASTOLIC BLOOD PRESSURE: 78 MMHG | BODY MASS INDEX: 24.74 KG/M2 | TEMPERATURE: 98.6 F | SYSTOLIC BLOOD PRESSURE: 122 MMHG

## 2024-07-08 DIAGNOSIS — G43.701 CHRONIC MIGRAINE WITHOUT AURA WITH STATUS MIGRAINOSUS, NOT INTRACTABLE: ICD-10-CM

## 2024-07-08 DIAGNOSIS — G43.109 MIGRAINE WITH AURA AND WITHOUT STATUS MIGRAINOSUS, NOT INTRACTABLE: ICD-10-CM

## 2024-07-08 DIAGNOSIS — M54.2 CERVICALGIA: Primary | ICD-10-CM

## 2024-07-08 DIAGNOSIS — G44.221 CHRONIC TENSION-TYPE HEADACHE, INTRACTABLE: ICD-10-CM

## 2024-07-08 PROCEDURE — 99214 OFFICE O/P EST MOD 30 MIN: CPT | Performed by: PSYCHIATRY & NEUROLOGY

## 2024-07-08 PROCEDURE — 3008F BODY MASS INDEX DOCD: CPT | Performed by: PSYCHIATRY & NEUROLOGY

## 2024-07-08 NOTE — PROGRESS NOTES
[facility]   Headache Center  Geeta Pagan MD  [address] ([address])  [place] [address]       Patient ID: Christa Grossman    : 1990  MRN: [id number]                                            Visit Date: 2024  Encounter Provider: Geeta Pagan  Referring Provider: No ref. provider found           Assessment/Plan   Problem List Items Addressed This Visit          Nervous    Cervicalgia - Primary    Chronic tension-type headache, intractable       Other    Chronic migraine without aura with status migrainosus, not intractable    Migraine with aura and without status migrainosus, not intractable           Please message us via My Chart with any questions or concerns.    Of note: Patient is currently breast-feeding will breast-feed for a year.    Vitamin B12: Please start taking B12 500 mcg daily or multivitamin daily.  2024: 388 (would like your level to be above 500)    Chronic migraine headaches without aura  Migraine headache with aura:    Preventive therapy for headaches:      - Continue Magnesium Oxide 100mg one at bedtime    - Continue B2 100 mg 1 in a.m.    - Cyproheptadine 4 mg quarter of a pill at bedtime nightly increase up as tolerated    - Will submit for Botox 20 units    - Consider Flexeril/tizanidine if cyproheptadine fails.    -Food to avoid chocolate, nitrates, MSG, aged cheese, peanut butter.     Abortive therapy for headaches:     - At the onset of headache: Axert (if this fails consider Amerge and then rizatriptan).    If that fails in 1 to 2 hours:    Next step: Reglan 10 mg    If this fails in 2 hours:    - Next step: Decadron 1 mg (with this pump and dump for 24 hours)      Cervicalgia (neck pain):  - Consider getting MRI of the cervical spine once patient is delivered      Insomnia:  - Prazosin 1 mg capsule  - Unisom 25 mg tab       Return in about 3 months (around 10/8/2024).          _________________________________________________________________      Chief Complaint: Follow-up      Subjective     We had the pleasure of evaluating Christa Grossman in neurological follow up today. As you know she  is a 34 y.o.  years old  right handed female.  she is here today for evaluation of migraines.  Work history: Has masters and is a   Personal history:   and delivered via  on 2024. Delivered earlier than her due date (due date Kimber 3, 2024).      Medical history review: She  QTC: None in chart review  Preeclampsia during first pregnancy    Psychiatry history review:  PTSD ( from her last job)  Depression: No   Anxiety: No  Seeing a psychiatrist: No  Seeing at therapist: Yes was seeing a therapist for PTSD      Headache/pain history:  Symptoms started at what age?  At age 11-migraine and temporomandibular joint pain after traumatic orthodontic work.  Migraine headache frequency increased in ..  Went to see a new primary care physician for migraine headaches and from that reported states patient had 3 migraine headache and the last 3 weeks and before that had not had a migraine for 9 months.     Previous/current treatments for headaches/pain/mood:   CBD or THC: NO   Interventional procedure: NO   Alternative therapies: yoga  Headache devices: Nerivio , Cefaly, Gamma cor, Tens units - none  Trigger point injection/Nerve block:NO   Botulinum toxin: NO   Headache infusions:NO   Preventive medication:   - Multivitamin,  - Amitriptyline (unable to take due to weight gain), Zoloft 100 mg tablet, Cymbalta 60 mg capsule,  - Topamax (unable to take due to cognitive concerns),  - Metoprolol 25 mg tablet,  Abortive medication:   - Sumatriptan 50 mg,  - Tylenol,  - Ibuprofen,  - Prednisone 20 mg  - Reglan 10 mg tablet,    FOLLOW-UP CLINIC NOTE:  First date seen: 2024  Last procedure date: None    2024: Since last seen patient states that she delivered on April  26 via  due to complications from preeclampsia.  Baby was originally due on Kimber 3.  She was born at 34 weeks.  Just within the last 2 weeks patient has had 4 migraine headaches.  She has been taking Reglan for that which does help.  Baby is now home from NICU as well.  Reports that she had no migraine headaches during pregnancy.  States that she did have aura with her migraine headache and one of them lasted for 2 hours typically they last less than 1 hour.  However the headaches that she had now were much less intense.    24: Since last seen patient has not noted any significant change in her headache pattern.  Is getting 1 severe headache a week and had 1 migraine headache with aura per month.  Feels oral may have lasted for shorter period time as she took Axert.  Did not take cyproheptadine nightly as patient states prescription she received stated take as needed for weather related headaches.    What is your current pain level?   Headache: 1/10  Neck pain:0/10    How often do the symptoms occur?   Mild headaches: daily since elementary school  24: daily    Moderate  headaches: during pregnancy: 3 a week, and before that 3 a week - since 20:3 a week    Severe headaches: none during pregnancy, 1-2 a month during summer of , then would have cluster that occurs once in a while.   24: one a week and 1 a month of migraine with aura.    Neck pain/soreness: varies from day to day due to stress - since 20: 5 days a week    What is the intensity of symptoms?   Mild headaches: 1-4/10  24: 2-3/10    Moderate headaches: 5-7/10  24: 6/10    Severe headaches:  10/10 or may go down to 8-9/10  24: 8-9/10 and once a month it is 10/10    Neck pain: /10  7/8/24: 1 to 8/10    How long do the symptoms last?   Mild headaches: baseline pain  24: intermittent throughout the day, comes a goes depends on the stress    Moderate headaches: varies due to trigger - if can eat it  gets better and may last for 12 hours to 4 days  7/8/24: with sleep and stress will last one hours. If she Ignores it then days    Severe headaches:  2 to 12 hours  7/8/24:1-2 hours, sometimes may come back in 2 hours or has post drome    Neck pain:30 minutes to 10 hours  7/8/24: varies and if she can stretch it does improve    What time of the day do symptoms start?  Mild headaches: baseline pain  Moderate headaches: mid afternoon or evening  Severe headaches:  anytime  Neck pain: anytime    Describe your usual symptoms?   Mild headaches: dull, aching, pressure  Moderate headaches: pounding and throbbing,   Severe headaches:  Pounding, throbbing, stabbing and sharp  Neck pain: Soreness, stiffness, at time sharp shooting pain radiating into her occipital region    Where are your symptoms located?   Mild headaches: diffuse pain, or anywhere in her head  Moderate headaches: Anywhere in headaches  Severe headaches:  Anywhere in her head  Neck pain: bilateral neck and starts at the base of her neck and radiates to the upper shoulder    Aura/Warning/Prodrome?   Aura onset at age: 4th grade  Central scotoma: Which was started off small and slowly enlarge.  She felt as if she could look through it as it was blurred.  Left visual field more than right.    Duration: 45 minutes.    Migraine aura pattern change in 2020    New onset aura: Scintillation scotoma and once the headache starts during her migraine headaches she will have numbness that starts from fifth digit and moves across her fingers onto the thumb.  And may have numbness and tingling of all fingers or the hand.  Could be in the right hand or the left hand.  Accompanied with numbness and tingling in the orbicularis auris region starting from one end of the mouth to the other. This lasts for about 2 minutes.  Duration: 45 minutes  2023:  - 5 migraine aura    2024: aura with each migraine and they lasted longer then her typical 30 minutes time   One lasted for 2  hours    7/8/24: one aura over the last one month. Typically one a month. Last aura lasted shorter as she has taken Axert.       Associated symptoms with headache or neck pain:   - Decrease appetite, Nausea, Vomiting, Diarrhea   - Photophobia, Phonophobia,   - Insomnia   - Stiff or sore neck  - Problems with concentration   - Hands or feet tingle or feel numb/paresthesias   - Better with lying down   - Prefer to be in a cool, quiet, dark room     Headache triggers: Stress, tension, weather change, lack of sleep, missing meals, dehydration    - I personally reviewed old notes over the last few months     Imaging/work-up:      Medications:   Current Outpatient Medications:     almotriptan (AXERT) 12.5 mg tablet, Take 1 tablet (12.5 mg total) by mouth once as needed for migraine. May repeat in 2 hours if unresolved. Do not exceed 25 mg in 24 hours., Disp: 12 tablet, Rfl: 3    azelaic acid (FINACEA) 15 % foam, Apply topically 2 (two) times a day., Disp: , Rfl:     cyproheptadine (PERIACTIN) 4 mg tablet, One tab or 1/2 a tab at bedtime nightly or as needed for weather related headaches, Disp: 30 tablet, Rfl: 3    dexAMETHasone (DECADRON) 1 mg tablet, One at the onset of mod to severe headaches with food, Disp: 10 tablet, Rfl: 1    DULoxetine (CYMBALTA) 60 mg capsule, 70mg, Disp: , Rfl:     ferrous sulfate 325 mg (65 mg iron) EC tablet, Take 1 tablet by mouth every other day., Disp: , Rfl:     metoclopramide (REGLAN) 10 mg tablet, Take 1 tablet (10 mg total) by mouth daily as needed (migraine)., Disp: 15 tablet, Rfl: 1    multivitamin capsule, Take 1 capsule by mouth daily., Disp: , Rfl:     NIFEdipine XL (PROCARDIA XL) 30 mg 24 hr tablet, Take 30 mg by mouth daily., Disp: , Rfl:     prazosin (MINIPRESS) 1 mg capsule, TAKE THREE CAPSULES BY MOUTH EVERY EVENING, Disp: , Rfl:     aspirin 81 mg enteric coated tablet, Take 81 mg by mouth daily., Disp: , Rfl:     doxylamine (UNISOM) 25 mg tablet, Take 12.5 mg by mouth., Disp:  ", Rfl:       Review of Systems  All other systems reviewed and negative except as noted in the HPI.  The following have been reviewed and updated as appropriate in this visit:    Objective   Physical Exam:  Visit Vitals  /78 (BP Location: Left upper arm, Patient Position: Sitting)   Pulse 78   Temp 37 °C (98.6 °F)   Resp 16   Ht 1.702 m (5' 7\")   Wt 71.5 kg (157 lb 9.6 oz)   SpO2 99%   BMI 24.68 kg/m²         Behavior/Emotional: Appropriate, cooperative     Neurologic Exam:  Alert and oriented.       CN: Intact    Motor: Moving all extremities without any difficulty.    Coordination: mild tremor noted- right more then left    Reflexes: 3/4 upper and 4/4 lower extremity.       Gait: Was narrow based.             Geeta Pagan MD    I spent 30 minutes on this date of service performing the following activities: obtaining history, performing examination, entering orders, documenting, preparing for visit, obtaining / reviewing records, providing counseling and education and independently reviewing study/studies.   "

## 2024-07-08 NOTE — PATIENT INSTRUCTIONS
Please message us via My Chart with any questions or concerns.    Of note: Patient is currently breast-feeding will breast-feed for a year.    Vitamin B12: Please start taking B12 500 mcg daily or multivitamin daily.  5/20/2024: 388 (would like your level to be above 500)    Chronic migraine headaches without aura  Migraine headache with aura:    Preventive therapy for headaches:      - Continue Magnesium Oxide 100mg one at bedtime    - Continue B2 100 mg 1 in a.m.    - Cyproheptadine 4 mg quarter of a pill at bedtime nightly increase up as tolerated    - Will submit for Botox 20 units    - Consider Flexeril/tizanidine if cyproheptadine fails.    -Food to avoid chocolate, nitrates, MSG, aged cheese, peanut butter.     Abortive therapy for headaches:     - At the onset of headache: Axert (if this fails consider Amerge and then rizatriptan).    If that fails in 1 to 2 hours:    Next step: Reglan 10 mg    If this fails in 2 hours:    - Next step: Decadron 1 mg (with this pump and dump for 24 hours)

## 2024-08-09 ENCOUNTER — TELEPHONE (OUTPATIENT)
Dept: NEUROLOGY | Facility: CLINIC | Age: 34
End: 2024-08-09
Payer: COMMERCIAL

## 2024-08-09 NOTE — TELEPHONE ENCOUNTER
----- Message from Jane Bhatia sent at 7/22/2024  2:44 PM EDT -----  Hi Dr. Pagan,       Just a FYI - her Botox was denied and is scanned into her chart. Looks like they are stating she needs to try at least 1 CGRP inhibitor first.     -Jane    ----- Message -----  From: Geeta Pagan MD  Sent: 7/8/2024  11:31 AM EDT  To: Lyndsay Underwood MA; Annemarie Owen; #    Need Botox 20 units for patient's chronic migraine headache.  Thank you

## 2024-10-11 ENCOUNTER — TELEMEDICINE (OUTPATIENT)
Dept: NEUROLOGY | Facility: CLINIC | Age: 34
End: 2024-10-11
Payer: COMMERCIAL

## 2024-10-11 DIAGNOSIS — G43.701 CHRONIC MIGRAINE WITHOUT AURA WITH STATUS MIGRAINOSUS, NOT INTRACTABLE: ICD-10-CM

## 2024-10-11 DIAGNOSIS — G43.109 MIGRAINE WITH AURA AND WITHOUT STATUS MIGRAINOSUS, NOT INTRACTABLE: ICD-10-CM

## 2024-10-11 PROCEDURE — 99214 OFFICE O/P EST MOD 30 MIN: CPT | Mod: 95 | Performed by: NURSE PRACTITIONER

## 2024-10-11 RX ORDER — CYPROHEPTADINE HYDROCHLORIDE 4 MG/1
TABLET ORAL
Qty: 30 TABLET | Refills: 3 | Status: SHIPPED | OUTPATIENT
Start: 2024-10-11

## 2024-10-11 RX ORDER — ALMOTRIPTAN 12.5 MG/1
12.5 TABLET, FILM COATED ORAL ONCE AS NEEDED
Qty: 12 TABLET | Refills: 3 | Status: SHIPPED | OUTPATIENT
Start: 2024-10-11 | End: 2025-10-11

## 2024-10-11 NOTE — PROGRESS NOTES
Verification of Patient Location:  The patient affirms they are currently located in the following state: [place]    Request for Consent:    Audio and Video Encounter   Kamron, my name is KOSTAS Kohler.  Before we proceed, can you please verify your identification by telling me your full name and date of birth?  Can you tell me who is in the room with you?    You and I are about to have a telemedicine check-in or visit because you have requested it.  This is a live video-conference.  I am a real person, speaking to you in real time.  There is no one else with me on the video-conference. I am not recording this conversation and I am asking you not to record it.  This telemedicine visit will be billed to your health insurance or you, if you are self-insured.  You understand you will be responsible for any copayments or coinsurances that apply to your telemedicine visit.  Communication platform used for this encounter:  [employer] Video Visit (Epic Video Client)       Before starting our telemedicine visit, I am required to get your consent for this virtual check-in or visit by telemedicine. Do you consent?    Patient Response to Request for Consent:  Yes      This encounter was conducted via Epic MyChart Video Visit functionality.  This visit did not require an in-person evaluation and was conducted remotely via telehealth systems without adversely affecting the quality of care delivered.    As part of this telemedicine visit, on the day of service, I spent 10 minutes reviewing Aplicor/Epic for all data/events relevant to this visit, composing this note, and conducting other visit follow up activities.    I spent 25 minutes directly evaluating and/or counseling and communicating with the patient or patient proxy.    The total duration of time spent on the day of service on this telemedicine encounter was 35 minutes.         Christa Grossman is a 34 y.o. old female presenting today for follow up.   She  reports she started Cyproheptadine 4 mg one month ago and Magnesium oxide 250 mg daily - this has been working .   She reports her baby is 5 months , she is planning to breastfeed for a year.     She reports she had a migraine with visual aura that lasted longer than usual on Labor Day and she went to [facility] for evaluation , was given 60 mg of IM Toradol , the aura resolved, Axert did not work that day for the migraine pain, she says.     Previous/current treatments for headaches/pain/mood:   CBD or THC: NO   Interventional procedure: NO   Alternative therapies: yoga  Headache devices: Nerivio , Cefaly, Gamma cor, Tens units - none  Trigger point injection/Nerve block:NO   Botulinum toxin: NO   Headache infusions:NO     Preventive medication:   - Multivitamin,  - Amitriptyline (unable to take due to weight gain), Zoloft 100 mg tablet, Cymbalta 60 mg capsule,  - Topamax (unable to take due to cognitive concerns)  - Metoprolol 25 mg tablet,  Abortive medication:   - Sumatriptan 50 mg,  - Tylenol,  - Ibuprofen,  - Prednisone 20 mg  - Reglan 10 mg tablet,        How often do the symptoms occur?     Mild headaches: from daily - has decreased - now gets headache free , still having more headache days per week      Moderate  headache: 3-4 days per week      Severe headaches:   1 per week , usually takes Tylenol 500 mg  or Advil        What time of the day do symptoms start?    Mild headaches: baseline pain  Moderate headaches: mid afternoon or evening  Severe headaches:  anytime  Neck pain: anytime     Describe your usual symptoms?   Mild headaches: dull, aching, pressure  Moderate headaches: pounding and throbbing,   Severe headaches:  Pounding, throbbing, stabbing and sharp  Neck pain: Soreness, stiffness, at time sharp shooting pain radiating into her occipital region     Where are your symptoms located?   Mild headaches: diffuse pain, or anywhere in her head  Moderate headaches: Anywhere in headaches  Severe  headaches:  Anywhere in her head  Neck pain: bilateral neck and starts at the base of her neck and radiates to the upper shoulder     Aura/Warning/Prodrome?   Aura onset at age: 6th grade  Central scotoma: Which was started off small and slowly enlarge.  She felt as if she could look through it as it was blurred.  Left visual field more than right.    Duration: 45 minutes.     Migraine aura pattern change in      New onset aura: Scintillation scotoma and once the headache starts during her migraine headaches she will have numbness that starts from fifth digit and moves across her fingers onto the thumb.  And may have numbness and tingling of all fingers or the hand.  Could be in the right hand or the left hand.  Accompanied with numbness and tingling in the orbicularis auris region starting from one end of the mouth to the other. This lasts for about 2 minutes.  Duration: 45 minutes  :  - 5 migraine aura     : aura with each migraine and they lasted longer then her typical 30 minutes time   One lasted for 2 hours     24: one aura over the last one month. Typically one a month. Last aura lasted shorter as she has taken Axert.         Associated symptoms with headache or neck pain:   - Decrease appetite, Nausea, Vomiting, Diarrhea   - Photophobia, Phonophobia,   - Insomnia   - Stiff or sore neck  - Problems with concentration   - Hands or feet tingle or feel numb/paresthesias   - Better with lying down   - Prefer to be in a cool, quiet, dark room      Headache triggers: Stress, tension, weather change, lack of sleep, missing meals, dehydration    Past Medical History:  has a past medical history of Migraines, PTSD (post-traumatic stress disorder), and Rosacea.  Past Surgical History:  has a past surgical history that includes  section.  Social History:   Social History     Tobacco Use    Smoking status: Never    Smokeless tobacco: Never   Vaping Use    Vaping status: Never Used   Substance Use  Topics    Alcohol use: Yes     Alcohol/week: 1.0 standard drink of alcohol     Types: 1 Glasses of wine per week    Drug use: Never   Diagnoses and all orders for this visit:    Chronic migraine without aura with status migrainosus, not intractable    -     almotriptan (AXERT) 12.5 mg tablet; Take 1 tablet (12.5 mg total) by mouth once as needed for migraine. May repeat in 2 hours if unresolved. Do not exceed 25 mg in 24 hours.    -     cyproheptadine (PERIACTIN) 4 mg tablet; One tab or 1/2 a tab at bedtime nightly or as needed for weather related headaches    Migraine with aura and without status migrainosus, not intractable    -     almotriptan (AXERT) 12.5 mg tablet; Take 1 tablet (12.5 mg total) by mouth once as needed for migraine. May repeat in 2 hours if unresolved. Do not exceed 25 mg in 24 hours.    -     cyproheptadine (PERIACTIN) 4 mg tablet; One tab or 1/2 a tab at bedtime nightly or as needed for weather related headaches      Vitamin B12: Please start taking B12 500 mcg daily or multivitamin daily.  5/20/2024: 388 (would like your level to be above 500)     Chronic migraine headaches without aura  Migraine headache with aura:     Preventive therapy for headaches:      - Continue Magnesium Oxide 250 mg one at bedtime     - Continue Riboflavin B2 200 mg 1 in a.m. or afternoon      - Cyproheptadine 4 mg quarter of a pill at bedtime nightly      Combination of Magnesium oxide and Cyproheptadine 4 mg     -Food to avoid chocolate, nitrates, MSG, aged cheese, peanut butter.      Abortive therapy for headaches:      - At the onset of headache: Axert (if this fails consider Amerge and then rizatriptan).     If that fails in 1 to 2 hours:     Next step: Reglan 10 mg     If this fails in 2 hours:     - Next step: Decadron 1 mg (with this pump and dump for 24 hours)